# Patient Record
Sex: MALE | Race: WHITE | NOT HISPANIC OR LATINO | Employment: FULL TIME | URBAN - METROPOLITAN AREA
[De-identification: names, ages, dates, MRNs, and addresses within clinical notes are randomized per-mention and may not be internally consistent; named-entity substitution may affect disease eponyms.]

---

## 2023-05-15 DIAGNOSIS — I10 HYPERTENSION, UNSPECIFIED TYPE: ICD-10-CM

## 2023-05-15 DIAGNOSIS — B34.9 VIRAL SYNDROME: Primary | ICD-10-CM

## 2023-05-15 PROBLEM — E55.9 VITAMIN D DEFICIENCY: Status: ACTIVE | Noted: 2023-05-15
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
## 2023-05-15 PROBLEM — F33.41 RECURRENT MAJOR DEPRESSIVE DISORDER, IN PARTIAL REMISSION (CMS-HCC): Status: ACTIVE | Noted: 2023-05-15

## 2023-05-15 PROBLEM — E11.65 TYPE 2 DIABETES MELLITUS WITH HYPERGLYCEMIA, WITHOUT LONG-TERM CURRENT USE OF INSULIN (MULTI): Status: ACTIVE | Noted: 2023-05-15

## 2023-05-15 PROBLEM — R05.9 COUGH: Status: ACTIVE | Noted: 2023-05-15

## 2023-05-15 PROBLEM — J22 ACUTE LOWER RESPIRATORY TRACT INFECTION: Status: ACTIVE | Noted: 2023-05-15

## 2023-05-15 PROBLEM — N63.0 BREAST MASS IN MALE: Status: ACTIVE | Noted: 2023-05-15

## 2023-05-15 PROBLEM — J34.89 NASAL OBSTRUCTION: Status: ACTIVE | Noted: 2023-05-15

## 2023-05-15 PROBLEM — R68.82 DECREASED LIBIDO WITHOUT SEXUAL DYSFUNCTION: Status: ACTIVE | Noted: 2023-05-15

## 2023-05-15 PROBLEM — G47.33 OBSTRUCTIVE SLEEP APNEA OF ADULT: Status: ACTIVE | Noted: 2023-05-15

## 2023-05-15 PROBLEM — L02.215 PERINEAL ABSCESS, SUPERFICIAL: Status: ACTIVE | Noted: 2023-05-15

## 2023-05-15 PROBLEM — R79.89 LOW TESTOSTERONE IN MALE: Status: ACTIVE | Noted: 2023-05-15

## 2023-05-15 PROBLEM — K20.0 EOSINOPHILIC ESOPHAGITIS: Status: ACTIVE | Noted: 2023-05-15

## 2023-05-15 PROBLEM — R06.83 HABITUAL SNORING: Status: ACTIVE | Noted: 2023-05-15

## 2023-05-15 PROBLEM — G62.9 NEUROPATHY: Status: ACTIVE | Noted: 2023-05-15

## 2023-05-15 PROBLEM — K21.9 GERD (GASTROESOPHAGEAL REFLUX DISEASE): Status: ACTIVE | Noted: 2023-05-15

## 2023-05-15 PROBLEM — R29.818 SUSPECTED SLEEP APNEA: Status: ACTIVE | Noted: 2023-05-15

## 2023-05-15 PROBLEM — J30.89 ENVIRONMENTAL AND SEASONAL ALLERGIES: Status: ACTIVE | Noted: 2023-05-15

## 2023-05-15 RX ORDER — INSULIN GLARGINE 100 [IU]/ML
INJECTION, SOLUTION SUBCUTANEOUS
COMMUNITY
Start: 2021-10-21 | End: 2024-02-08 | Stop reason: SDUPTHER

## 2023-05-15 RX ORDER — ARIPIPRAZOLE 5 MG/1
5 TABLET ORAL DAILY
COMMUNITY

## 2023-05-15 RX ORDER — METFORMIN HYDROCHLORIDE 500 MG/1
1 TABLET, EXTENDED RELEASE ORAL 2 TIMES DAILY
COMMUNITY
End: 2024-02-08 | Stop reason: SDUPTHER

## 2023-05-15 RX ORDER — ALBUTEROL SULFATE 90 UG/1
2 AEROSOL, METERED RESPIRATORY (INHALATION)
COMMUNITY
Start: 2022-05-17 | End: 2023-07-10 | Stop reason: ALTCHOICE

## 2023-05-15 RX ORDER — ESOMEPRAZOLE MAGNESIUM 40 MG/1
40 CAPSULE, DELAYED RELEASE ORAL DAILY PRN
COMMUNITY
End: 2023-07-10 | Stop reason: SDUPTHER

## 2023-05-15 RX ORDER — PEN NEEDLE, DIABETIC 32GX 5/32"
NEEDLE, DISPOSABLE MISCELLANEOUS
COMMUNITY
Start: 2023-05-07 | End: 2024-02-08 | Stop reason: SDUPTHER

## 2023-05-15 RX ORDER — BLOOD-GLUCOSE METER
KIT MISCELLANEOUS
COMMUNITY
Start: 2022-03-29

## 2023-05-15 RX ORDER — LISINOPRIL 40 MG/1
1 TABLET ORAL DAILY
COMMUNITY
Start: 2019-10-31 | End: 2023-05-15 | Stop reason: SDUPTHER

## 2023-05-15 RX ORDER — FLUTICASONE FUROATE AND VILANTEROL TRIFENATATE 100; 25 UG/1; UG/1
1 POWDER RESPIRATORY (INHALATION)
COMMUNITY
Start: 2022-05-17 | End: 2023-07-10 | Stop reason: ALTCHOICE

## 2023-05-15 RX ORDER — SEMAGLUTIDE 1.34 MG/ML
0.25 INJECTION, SOLUTION SUBCUTANEOUS
COMMUNITY
End: 2023-07-10 | Stop reason: ALTCHOICE

## 2023-05-15 RX ORDER — LISINOPRIL 40 MG/1
TABLET ORAL
Qty: 90 TABLET | Refills: 0 | Status: SHIPPED | OUTPATIENT
Start: 2023-05-15 | End: 2023-07-10 | Stop reason: SDUPTHER

## 2023-05-15 RX ORDER — FAMOTIDINE 20 MG/1
20 TABLET, FILM COATED ORAL
COMMUNITY
Start: 2022-01-10 | End: 2023-07-10 | Stop reason: ALTCHOICE

## 2023-05-15 RX ORDER — LANCETS 28 GAUGE
1 EACH MISCELLANEOUS AS NEEDED
COMMUNITY
Start: 2023-02-03

## 2023-05-15 RX ORDER — DESVENLAFAXINE 100 MG/1
100 TABLET, EXTENDED RELEASE ORAL DAILY
COMMUNITY

## 2023-05-15 RX ORDER — LORATADINE 10 MG/1
1 CAPSULE, LIQUID FILLED ORAL DAILY
COMMUNITY

## 2023-05-15 RX ORDER — ROSUVASTATIN CALCIUM 5 MG/1
1 TABLET, COATED ORAL DAILY
COMMUNITY
Start: 2022-07-05 | End: 2024-02-08 | Stop reason: SDUPTHER

## 2023-05-15 RX ORDER — DULAGLUTIDE 3 MG/.5ML
INJECTION, SOLUTION SUBCUTANEOUS
COMMUNITY
Start: 2022-02-25 | End: 2024-03-21

## 2023-05-15 RX ORDER — MONTELUKAST SODIUM 10 MG/1
1 TABLET ORAL DAILY
COMMUNITY
Start: 2020-02-04 | End: 2023-05-15 | Stop reason: SDUPTHER

## 2023-05-15 RX ORDER — MONTELUKAST SODIUM 10 MG/1
TABLET ORAL
Qty: 90 TABLET | Refills: 0 | Status: SHIPPED | OUTPATIENT
Start: 2023-05-15 | End: 2023-07-10 | Stop reason: SDUPTHER

## 2023-05-15 RX ORDER — BENZONATATE 100 MG/1
100 CAPSULE ORAL 3 TIMES DAILY PRN
COMMUNITY
Start: 2022-05-06 | End: 2023-07-10 | Stop reason: ALTCHOICE

## 2023-05-15 RX ORDER — OMEPRAZOLE 40 MG/1
40 CAPSULE, DELAYED RELEASE ORAL
COMMUNITY
End: 2023-07-10

## 2023-05-15 RX ORDER — EMPAGLIFLOZIN 10 MG/1
1 TABLET, FILM COATED ORAL DAILY
COMMUNITY
Start: 2022-06-30 | End: 2024-02-08 | Stop reason: SDUPTHER

## 2023-07-10 ENCOUNTER — OFFICE VISIT (OUTPATIENT)
Dept: PRIMARY CARE | Facility: CLINIC | Age: 43
End: 2023-07-10
Payer: COMMERCIAL

## 2023-07-10 VITALS
WEIGHT: 280 LBS | SYSTOLIC BLOOD PRESSURE: 107 MMHG | BODY MASS INDEX: 39.61 KG/M2 | RESPIRATION RATE: 16 BRPM | TEMPERATURE: 96.8 F | OXYGEN SATURATION: 99 % | HEART RATE: 78 BPM | DIASTOLIC BLOOD PRESSURE: 72 MMHG

## 2023-07-10 DIAGNOSIS — E78.5 DYSLIPIDEMIA: ICD-10-CM

## 2023-07-10 DIAGNOSIS — K21.00 GASTROESOPHAGEAL REFLUX DISEASE WITH ESOPHAGITIS, UNSPECIFIED WHETHER HEMORRHAGE: Primary | ICD-10-CM

## 2023-07-10 DIAGNOSIS — I10 HYPERTENSION, UNSPECIFIED TYPE: ICD-10-CM

## 2023-07-10 DIAGNOSIS — B34.9 VIRAL SYNDROME: ICD-10-CM

## 2023-07-10 DIAGNOSIS — E11.65 TYPE 2 DIABETES MELLITUS WITH HYPERGLYCEMIA, WITHOUT LONG-TERM CURRENT USE OF INSULIN (MULTI): ICD-10-CM

## 2023-07-10 DIAGNOSIS — E55.9 VITAMIN D DEFICIENCY: ICD-10-CM

## 2023-07-10 PROBLEM — R06.83 HABITUAL SNORING: Status: RESOLVED | Noted: 2023-05-15 | Resolved: 2023-07-10
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
## 2023-07-10 PROBLEM — J22 ACUTE LOWER RESPIRATORY TRACT INFECTION: Status: RESOLVED | Noted: 2023-05-15 | Resolved: 2023-07-10

## 2023-07-10 PROBLEM — R29.818 SUSPECTED SLEEP APNEA: Status: RESOLVED | Noted: 2023-05-15 | Resolved: 2023-07-10

## 2023-07-10 PROBLEM — R05.9 COUGH: Status: RESOLVED | Noted: 2023-05-15 | Resolved: 2023-07-10

## 2023-07-10 PROCEDURE — 3074F SYST BP LT 130 MM HG: CPT

## 2023-07-10 PROCEDURE — 99214 OFFICE O/P EST MOD 30 MIN: CPT

## 2023-07-10 PROCEDURE — 1036F TOBACCO NON-USER: CPT

## 2023-07-10 PROCEDURE — 3044F HG A1C LEVEL LT 7.0%: CPT

## 2023-07-10 PROCEDURE — 4010F ACE/ARB THERAPY RXD/TAKEN: CPT

## 2023-07-10 PROCEDURE — 3078F DIAST BP <80 MM HG: CPT

## 2023-07-10 RX ORDER — LISINOPRIL 40 MG/1
40 TABLET ORAL DAILY
Qty: 90 TABLET | Refills: 3 | Status: SHIPPED | OUTPATIENT
Start: 2023-07-10 | End: 2024-01-10 | Stop reason: SDUPTHER

## 2023-07-10 RX ORDER — ESOMEPRAZOLE MAGNESIUM 40 MG/1
40 CAPSULE, DELAYED RELEASE ORAL
Qty: 90 CAPSULE | Refills: 3 | Status: SHIPPED | OUTPATIENT
Start: 2023-07-10 | End: 2024-01-10 | Stop reason: SDUPTHER

## 2023-07-10 RX ORDER — MONTELUKAST SODIUM 10 MG/1
10 TABLET ORAL DAILY
Qty: 90 TABLET | Refills: 3 | Status: SHIPPED | OUTPATIENT
Start: 2023-07-10 | End: 2024-01-10 | Stop reason: SDUPTHER

## 2023-07-10 SDOH — ECONOMIC STABILITY: FOOD INSECURITY: WITHIN THE PAST 12 MONTHS, YOU WORRIED THAT YOUR FOOD WOULD RUN OUT BEFORE YOU GOT MONEY TO BUY MORE.: NEVER TRUE

## 2023-07-10 SDOH — ECONOMIC STABILITY: FOOD INSECURITY: WITHIN THE PAST 12 MONTHS, THE FOOD YOU BOUGHT JUST DIDN'T LAST AND YOU DIDN'T HAVE MONEY TO GET MORE.: NEVER TRUE

## 2023-07-10 ASSESSMENT — ENCOUNTER SYMPTOMS
RESPIRATORY NEGATIVE: 1
MUSCULOSKELETAL NEGATIVE: 1
EYES NEGATIVE: 1
CONSTITUTIONAL NEGATIVE: 1
ENDOCRINE NEGATIVE: 1
NEUROLOGICAL NEGATIVE: 1
PSYCHIATRIC NEGATIVE: 1
GASTROINTESTINAL NEGATIVE: 1
CARDIOVASCULAR NEGATIVE: 1
HEMATOLOGIC/LYMPHATIC NEGATIVE: 1

## 2023-07-10 ASSESSMENT — PAIN SCALES - GENERAL: PAINLEVEL: 0-NO PAIN

## 2023-07-10 ASSESSMENT — PATIENT HEALTH QUESTIONNAIRE - PHQ9
SUM OF ALL RESPONSES TO PHQ9 QUESTIONS 1 & 2: 0
2. FEELING DOWN, DEPRESSED OR HOPELESS: NOT AT ALL
1. LITTLE INTEREST OR PLEASURE IN DOING THINGS: NOT AT ALL

## 2023-07-10 ASSESSMENT — LIFESTYLE VARIABLES
SKIP TO QUESTIONS 9-10: 1
HOW OFTEN DO YOU HAVE SIX OR MORE DRINKS ON ONE OCCASION: NEVER
HOW OFTEN DO YOU HAVE A DRINK CONTAINING ALCOHOL: MONTHLY OR LESS
HOW MANY STANDARD DRINKS CONTAINING ALCOHOL DO YOU HAVE ON A TYPICAL DAY: 1 OR 2
AUDIT-C TOTAL SCORE: 1

## 2023-07-10 NOTE — PROGRESS NOTES
"Subjective   Patient ID: Elie Mendenhall is a 43 y.o. male who presents for routine follow up visit and follow up of hypertension, DM2 and GERD.  Following with Dr. Reagan in endocrinology for DM2.      Diet: Mostly chicken and chicken and soup, chicken and rice, broccoli, riced cauliflower. [place] for breakfast, smart carb. Carb controlled diet. 3-4 cans diet soda per day  Exercise: No regular exercise  Weight: Down 23lbs since 2/2023  Water: Drinking about 60 oz per day  Sleep: Good sleep, using CPAP nightly. Getting about 8-9 hours per night  Social: , lives in a 2 floor home, no issues with stairs. No children, no pets  Professional: Currently in between jobs, /manager    Review of Systems   Constitutional: Negative.    HENT: Negative.     Eyes: Negative.    Respiratory: Negative.     Cardiovascular: Negative.    Gastrointestinal: Negative.    Endocrine: Negative.    Genitourinary: Negative.    Musculoskeletal: Negative.    Skin: Negative.    Neurological: Negative.    Hematological: Negative.    Psychiatric/Behavioral: Negative.          Current Outpatient Medications   Medication Sig Dispense Refill    ARIPiprazole (Abilify) 5 mg tablet Take 1 tablet (5 mg) by mouth once daily.      Basaglar KwikPen U-100 Insulin 100 unit/mL (3 mL) pen Inject under the skin.      BD Taylor 2nd Gen Pen Needle 32 gauge x 5/32\" needle       desvenlafaxine 100 mg 24 hr tablet Take 1 tablet (100 mg) by mouth once daily.      FreeStyle Lancets 28 gauge 1 each if needed.      FreeStyle Lite Strips strip TEST THREE TIMES A DAY      Jardiance 10 mg Take 1 tablet (10 mg) by mouth once daily.      loratadine (Claritin Liqui-Gel) 10 mg capsule Take 1 tablet by mouth once daily.      metFORMIN XR (Glucophage-XR) 500 mg 24 hr tablet Take 1 tablet (500 mg) by mouth 2 times a day.      rosuvastatin (Crestor) 5 mg tablet Take 1 tablet (5 mg) by mouth once daily.      Trulicity 3 mg/0.5 mL pen injector Inject under the skin.      " esomeprazole (NexIUM) 40 mg DR capsule Take 1 capsule (40 mg) by mouth once daily in the morning. Take before meals. 90 capsule 3    lisinopril 40 mg tablet Take 1 tablet (40 mg) by mouth once daily. 90 tablet 3    montelukast (Singulair) 10 mg tablet Take 1 tablet (10 mg) by mouth once daily. 90 tablet 3     No current facility-administered medications for this visit.     Past Surgical History:   Procedure Laterality Date    OTHER SURGICAL HISTORY  04/04/2016    Ant Spinal Diskect Osteophytect Lumb Interspace Microdiscect    OTHER SURGICAL HISTORY  01/30/2020    Corneal lasik    OTHER SURGICAL HISTORY  01/30/2020    Tonsillectomy     Family History   Problem Relation Name Age of Onset    Ovarian cancer Mother      Arthritis Father      Depression Father      Other (stroke syndrome) Other        Social History     Tobacco Use    Smoking status: Never    Smokeless tobacco: Never   Vaping Use    Vaping Use: Former   Substance Use Topics    Alcohol use: Not Currently    Drug use: Never        Objective     Visit Vitals  /72 (BP Location: Right arm, Patient Position: Sitting, BP Cuff Size: Large adult)   Pulse 78   Temp 36 °C (96.8 °F) (Temporal)   Resp 16   Wt 127 kg (280 lb)   SpO2 99%   BMI 39.61 kg/m²   Smoking Status Never   BSA 2.51 m²        Physical Exam  Constitutional:       Appearance: He is obese.   HENT:      Head: Normocephalic and atraumatic.   Eyes:      Extraocular Movements: Extraocular movements intact.      Pupils: Pupils are equal, round, and reactive to light.   Cardiovascular:      Rate and Rhythm: Normal rate and regular rhythm.   Pulmonary:      Effort: Pulmonary effort is normal.      Breath sounds: Normal breath sounds.   Abdominal:      General: Abdomen is flat. Bowel sounds are normal.      Palpations: Abdomen is soft.   Musculoskeletal:         General: Normal range of motion.      Cervical back: Neck supple. No tenderness.   Skin:     General: Skin is warm and dry.   Neurological:       General: No focal deficit present.      Mental Status: He is alert and oriented to person, place, and time.   Psychiatric:         Mood and Affect: Mood normal.         Behavior: Behavior normal.           Assessment/Plan   Problem List Items Addressed This Visit       GERD (gastroesophageal reflux disease) - Primary     Well managed on esomeprazole 40mg daily, cannot miss for more than 1 day         Relevant Medications    esomeprazole (NexIUM) 40 mg DR capsule    Type 2 diabetes mellitus with hyperglycemia, without long-term current use of insulin (CMS/[facility])    Relevant Orders    CBC    Hemoglobin A1C    Comprehensive Metabolic Panel    Vitamin D deficiency     Vitamin D low at 28 in 2021    Repeat vitamin D level         Relevant Orders    Vitamin D, Total     Other Visit Diagnoses       Viral syndrome        Relevant Medications    montelukast (Singulair) 10 mg tablet    Hypertension, unspecified type        Relevant Medications    lisinopril 40 mg tablet    Dyslipidemia        Relevant Orders    Lipid Panel          All pertinent lab work and results were reviewed with patient.     Follow up with me in 6 months     MICHELLE Virgen-CNS

## 2023-07-10 NOTE — PATIENT INSTRUCTIONS
Thank you for coming to see me today.  If you have any questions or concerns following our visit, please contact the office.  Phone: [phone]    Follow up with me in 6 months or sooner as needed    1)  Get fasting labwork in the next 1-2 weeks.  The lab is down the [place] from our office.

## 2023-11-29 ENCOUNTER — TELEPHONE (OUTPATIENT)
Dept: PRIMARY CARE | Facility: CLINIC | Age: 43
End: 2023-11-29
Payer: COMMERCIAL

## 2023-11-29 NOTE — TELEPHONE ENCOUNTER
Patient called in stating that he has been having dizzy spells for about a month or so. Patient states that he gets head aches as well but isnt sure if it is related. Patient states that it is random not consistent but has been getting worse over the last week

## 2024-01-05 ENCOUNTER — LAB (OUTPATIENT)
Dept: LAB | Facility: LAB | Age: 44
End: 2024-01-05
Payer: COMMERCIAL

## 2024-01-05 DIAGNOSIS — E78.5 DYSLIPIDEMIA: ICD-10-CM

## 2024-01-05 DIAGNOSIS — E55.9 VITAMIN D DEFICIENCY: ICD-10-CM

## 2024-01-05 DIAGNOSIS — E11.65 TYPE 2 DIABETES MELLITUS WITH HYPERGLYCEMIA, WITHOUT LONG-TERM CURRENT USE OF INSULIN (MULTI): ICD-10-CM

## 2024-01-05 LAB
25(OH)D3 SERPL-MCNC: 13 NG/ML (ref 30–100)
ALBUMIN SERPL BCP-MCNC: 4.3 G/DL (ref 3.4–5)
ALP SERPL-CCNC: 69 U/L (ref 33–120)
ALT SERPL W P-5'-P-CCNC: 37 U/L (ref 10–52)
ANION GAP SERPL CALC-SCNC: 12 MMOL/L (ref 10–20)
AST SERPL W P-5'-P-CCNC: 21 U/L (ref 9–39)
BILIRUB SERPL-MCNC: 0.4 MG/DL (ref 0–1.2)
BUN SERPL-MCNC: 18 MG/DL (ref 6–23)
CALCIUM SERPL-MCNC: 9.3 MG/DL (ref 8.6–10.3)
CHLORIDE SERPL-SCNC: 103 MMOL/L (ref 98–107)
CHOLEST SERPL-MCNC: 135 MG/DL (ref 0–199)
CHOLESTEROL/HDL RATIO: 3.5
CO2 SERPL-SCNC: 27 MMOL/L (ref 21–32)
CREAT SERPL-MCNC: 0.93 MG/DL (ref 0.5–1.3)
ERYTHROCYTE [DISTWIDTH] IN BLOOD BY AUTOMATED COUNT: 13.7 % (ref 11.5–14.5)
EST. AVERAGE GLUCOSE BLD GHB EST-MCNC: 131 MG/DL
GFR SERPL CREATININE-BSD FRML MDRD: >90 ML/MIN/1.73M*2
GLUCOSE SERPL-MCNC: 117 MG/DL (ref 74–99)
HBA1C MFR BLD: 6.2 %
HCT VFR BLD AUTO: 47.1 % (ref 41–52)
HDLC SERPL-MCNC: 38.2 MG/DL
HGB BLD-MCNC: 15.4 G/DL (ref 13.5–17.5)
LDLC SERPL CALC-MCNC: 72 MG/DL
MCH RBC QN AUTO: 27.3 PG (ref 26–34)
MCHC RBC AUTO-ENTMCNC: 32.7 G/DL (ref 32–36)
MCV RBC AUTO: 83 FL (ref 80–100)
NON HDL CHOLESTEROL: 97 MG/DL (ref 0–149)
NRBC BLD-RTO: 0 /100 WBCS (ref 0–0)
PLATELET # BLD AUTO: 281 X10*3/UL (ref 150–450)
POTASSIUM SERPL-SCNC: 4 MMOL/L (ref 3.5–5.3)
PROT SERPL-MCNC: 6.7 G/DL (ref 6.4–8.2)
RBC # BLD AUTO: 5.65 X10*6/UL (ref 4.5–5.9)
SODIUM SERPL-SCNC: 138 MMOL/L (ref 136–145)
TRIGL SERPL-MCNC: 125 MG/DL (ref 0–149)
VLDL: 25 MG/DL (ref 0–40)
WBC # BLD AUTO: 10.1 X10*3/UL (ref 4.4–11.3)

## 2024-01-05 PROCEDURE — 36415 COLL VENOUS BLD VENIPUNCTURE: CPT

## 2024-01-05 PROCEDURE — 85027 COMPLETE CBC AUTOMATED: CPT

## 2024-01-05 PROCEDURE — 82306 VITAMIN D 25 HYDROXY: CPT

## 2024-01-05 PROCEDURE — 83036 HEMOGLOBIN GLYCOSYLATED A1C: CPT

## 2024-01-05 PROCEDURE — 80061 LIPID PANEL: CPT

## 2024-01-05 PROCEDURE — 80053 COMPREHEN METABOLIC PANEL: CPT

## 2024-01-10 ENCOUNTER — OFFICE VISIT (OUTPATIENT)
Dept: PRIMARY CARE | Facility: CLINIC | Age: 44
End: 2024-01-10
Payer: COMMERCIAL

## 2024-01-10 VITALS
RESPIRATION RATE: 20 BRPM | DIASTOLIC BLOOD PRESSURE: 77 MMHG | SYSTOLIC BLOOD PRESSURE: 109 MMHG | OXYGEN SATURATION: 96 % | HEIGHT: 71 IN | HEART RATE: 95 BPM | WEIGHT: 310 LBS | TEMPERATURE: 96.9 F | BODY MASS INDEX: 43.4 KG/M2

## 2024-01-10 DIAGNOSIS — G47.33 OBSTRUCTIVE SLEEP APNEA OF ADULT: ICD-10-CM

## 2024-01-10 DIAGNOSIS — J30.89 ENVIRONMENTAL AND SEASONAL ALLERGIES: ICD-10-CM

## 2024-01-10 DIAGNOSIS — R42 VERTIGO: Primary | ICD-10-CM

## 2024-01-10 DIAGNOSIS — K21.00 GASTROESOPHAGEAL REFLUX DISEASE WITH ESOPHAGITIS, UNSPECIFIED WHETHER HEMORRHAGE: ICD-10-CM

## 2024-01-10 DIAGNOSIS — F33.41 RECURRENT MAJOR DEPRESSIVE DISORDER, IN PARTIAL REMISSION (CMS-HCC): ICD-10-CM

## 2024-01-10 DIAGNOSIS — I10 HYPERTENSION, UNSPECIFIED TYPE: ICD-10-CM

## 2024-01-10 DIAGNOSIS — I10 BENIGN ESSENTIAL HYPERTENSION: ICD-10-CM

## 2024-01-10 DIAGNOSIS — B34.9 VIRAL SYNDROME: ICD-10-CM

## 2024-01-10 DIAGNOSIS — R42 DIZZINESS: ICD-10-CM

## 2024-01-10 DIAGNOSIS — E55.9 VITAMIN D DEFICIENCY: ICD-10-CM

## 2024-01-10 PROBLEM — L02.215 PERINEAL ABSCESS, SUPERFICIAL: Status: RESOLVED | Noted: 2023-05-15 | Resolved: 2024-01-10

## 2024-01-10 PROCEDURE — 3078F DIAST BP <80 MM HG: CPT

## 2024-01-10 PROCEDURE — 1036F TOBACCO NON-USER: CPT

## 2024-01-10 PROCEDURE — 3048F LDL-C <100 MG/DL: CPT

## 2024-01-10 PROCEDURE — 3074F SYST BP LT 130 MM HG: CPT

## 2024-01-10 PROCEDURE — 99214 OFFICE O/P EST MOD 30 MIN: CPT

## 2024-01-10 PROCEDURE — 3044F HG A1C LEVEL LT 7.0%: CPT

## 2024-01-10 PROCEDURE — 4010F ACE/ARB THERAPY RXD/TAKEN: CPT

## 2024-01-10 RX ORDER — ESOMEPRAZOLE MAGNESIUM 40 MG/1
40 CAPSULE, DELAYED RELEASE ORAL
Qty: 90 CAPSULE | Refills: 3 | Status: SHIPPED | OUTPATIENT
Start: 2024-01-10

## 2024-01-10 RX ORDER — LISINOPRIL 40 MG/1
40 TABLET ORAL DAILY
Qty: 90 TABLET | Refills: 3 | Status: SHIPPED | OUTPATIENT
Start: 2024-01-10

## 2024-01-10 RX ORDER — MECLIZINE HYDROCHLORIDE 25 MG/1
25 TABLET ORAL 3 TIMES DAILY PRN
Qty: 30 TABLET | Refills: 1 | Status: SHIPPED | OUTPATIENT
Start: 2024-01-10 | End: 2025-01-09

## 2024-01-10 RX ORDER — ERGOCALCIFEROL 1.25 MG/1
50000 CAPSULE ORAL
Qty: 8 CAPSULE | Refills: 0 | Status: SHIPPED | OUTPATIENT
Start: 2024-01-10

## 2024-01-10 RX ORDER — MONTELUKAST SODIUM 10 MG/1
10 TABLET ORAL DAILY
Qty: 90 TABLET | Refills: 3 | Status: SHIPPED | OUTPATIENT
Start: 2024-01-10

## 2024-01-10 SDOH — ECONOMIC STABILITY: FOOD INSECURITY: WITHIN THE PAST 12 MONTHS, THE FOOD YOU BOUGHT JUST DIDN'T LAST AND YOU DIDN'T HAVE MONEY TO GET MORE.: NEVER TRUE

## 2024-01-10 SDOH — ECONOMIC STABILITY: FOOD INSECURITY: WITHIN THE PAST 12 MONTHS, YOU WORRIED THAT YOUR FOOD WOULD RUN OUT BEFORE YOU GOT MONEY TO BUY MORE.: NEVER TRUE

## 2024-01-10 ASSESSMENT — PATIENT HEALTH QUESTIONNAIRE - PHQ9
2. FEELING DOWN, DEPRESSED OR HOPELESS: SEVERAL DAYS
10. IF YOU CHECKED OFF ANY PROBLEMS, HOW DIFFICULT HAVE THESE PROBLEMS MADE IT FOR YOU TO DO YOUR WORK, TAKE CARE OF THINGS AT HOME, OR GET ALONG WITH OTHER PEOPLE: SOMEWHAT DIFFICULT
SUM OF ALL RESPONSES TO PHQ9 QUESTIONS 1 & 2: 2
1. LITTLE INTEREST OR PLEASURE IN DOING THINGS: SEVERAL DAYS

## 2024-01-10 ASSESSMENT — ENCOUNTER SYMPTOMS
EYES NEGATIVE: 1
HEMATOLOGIC/LYMPHATIC NEGATIVE: 1
MUSCULOSKELETAL NEGATIVE: 1
NEUROLOGICAL NEGATIVE: 1
RESPIRATORY NEGATIVE: 1
GASTROINTESTINAL NEGATIVE: 1
CARDIOVASCULAR NEGATIVE: 1
CONSTITUTIONAL NEGATIVE: 1
ENDOCRINE NEGATIVE: 1
PSYCHIATRIC NEGATIVE: 1

## 2024-01-10 ASSESSMENT — LIFESTYLE VARIABLES
HOW OFTEN DO YOU HAVE A DRINK CONTAINING ALCOHOL: MONTHLY OR LESS
HOW OFTEN DO YOU HAVE SIX OR MORE DRINKS ON ONE OCCASION: NEVER
AUDIT-C TOTAL SCORE: 1
SKIP TO QUESTIONS 9-10: 1
HOW MANY STANDARD DRINKS CONTAINING ALCOHOL DO YOU HAVE ON A TYPICAL DAY: 1 OR 2

## 2024-01-10 NOTE — ASSESSMENT & PLAN NOTE
Following with psychiatrist Venus Domingo at [facility] in [place] who is managing aripiprazole and desvenlafaxine. Mood good on these.

## 2024-01-10 NOTE — ASSESSMENT & PLAN NOTE
Vitamin D low at 13 on labs from 1/2023    Start ergocalciferol 46362 units weekly x 8 weeks then start vitamin D 2000 units daily

## 2024-01-10 NOTE — ASSESSMENT & PLAN NOTE
HR rate and rhythm normal, relatively normotensive in office at 109/77  Describes room spinning rather than lightheaded/passing out sensation    Start meclizine 25mg TID PRN for dizziness  Consider CV/neuro testing if ineffective or symptoms worsen

## 2024-01-10 NOTE — PROGRESS NOTES
"Subjective   Patient ID: Elie Mendenhall is a 43 y.o. male who presents for 6 month follow up and evaluation of dizziness and nasal congestion.    Reports dizzy spells for the past 3 months, advised to check blood pressures. Hasn't covered every instance, sometimes would walk into the car after work and won't go back in to office to check it. 110-130s/70-90s when checked incidentally. Half of the time has concurrent headaches and nausea but nothing otherwise. Has had a few times before alexa felt like was going to throw up. Room spinning.     Diet: Mostly chicken and chicken and soup, chicken and rice, broccoli, riced cauliflower. [place] for breakfast, smart carb. Carb controlled diet. 3-4 cans diet soda per day  Exercise: No regular exercise  Weight: Down 23lbs since 2/2023  Water: Drinking about 60 oz per day  Sleep: Good sleep, using CPAP nightly. Getting about 8-9 hours per night  Social: , lives in a 2 floor home, no issues with stairs. No children, no pets  Professional: Working full time in /manager    Review of Systems   Constitutional: Negative.    HENT: Negative.     Eyes: Negative.    Respiratory: Negative.     Cardiovascular: Negative.    Gastrointestinal: Negative.    Endocrine: Negative.    Genitourinary: Negative.    Musculoskeletal: Negative.    Skin: Negative.    Neurological: Negative.    Hematological: Negative.    Psychiatric/Behavioral: Negative.          Current Outpatient Medications   Medication Sig Dispense Refill    ARIPiprazole (Abilify) 5 mg tablet Take 1 tablet (5 mg) by mouth once daily.      Basaglar KwikPen U-100 Insulin 100 unit/mL (3 mL) pen Inject under the skin.      BD Taylor 2nd Gen Pen Needle 32 gauge x 5/32\" needle       desvenlafaxine 100 mg 24 hr tablet Take 1 tablet (100 mg) by mouth once daily.      FreeStyle Lancets 28 gauge 1 each if needed.      FreeStyle Lite Strips strip TEST THREE TIMES A DAY      Jardiance 10 mg Take 1 tablet (10 mg) by mouth once " "daily.      loratadine (Claritin Liqui-Gel) 10 mg capsule Take 1 tablet by mouth once daily.      metFORMIN XR (Glucophage-XR) 500 mg 24 hr tablet Take 1 tablet (500 mg) by mouth 2 times a day.      rosuvastatin (Crestor) 5 mg tablet Take 1 tablet (5 mg) by mouth once daily.      Trulicity 3 mg/0.5 mL pen injector Inject under the skin.      ergocalciferol (Vitamin D-2) 1.25 MG (56825 UT) capsule Take 1 capsule (50,000 Units) by mouth 1 (one) time per week. Start vitamin D 2000 units daily after completing this medication 8 capsule 0    esomeprazole (NexIUM) 40 mg DR capsule Take 1 capsule (40 mg) by mouth once daily in the morning. Take before meals. 90 capsule 3    lisinopril 40 mg tablet Take 1 tablet (40 mg) by mouth once daily. 90 tablet 3    meclizine (Antivert) 25 mg tablet Take 1 tablet (25 mg) by mouth 3 times a day as needed for dizziness. 30 tablet 1    montelukast (Singulair) 10 mg tablet Take 1 tablet (10 mg) by mouth once daily. 90 tablet 3     No current facility-administered medications for this visit.     Past Surgical History:   Procedure Laterality Date    OTHER SURGICAL HISTORY  04/04/2016    Ant Spinal Diskect Osteophytect Lumb Interspace Microdiscect    OTHER SURGICAL HISTORY  01/30/2020    Corneal lasik    OTHER SURGICAL HISTORY  01/30/2020    Tonsillectomy     Family History   Problem Relation Name Age of Onset    Ovarian cancer Mother      Arthritis Father      Depression Father      Other (stroke syndrome) Other        Social History     Tobacco Use    Smoking status: Never    Smokeless tobacco: Never   Vaping Use    Vaping Use: Former   Substance Use Topics    Alcohol use: Not Currently    Drug use: Never        Objective     Visit Vitals  /77 (BP Location: Left arm, Patient Position: Sitting, BP Cuff Size: Adult)   Pulse 95   Temp 36.1 °C (96.9 °F) (Temporal)   Resp 20   Ht 1.791 m (5' 10.5\")   Wt 141 kg (310 lb)   SpO2 96%   BMI 43.85 kg/m²   Smoking Status Never   BSA 2.65 m²    "     Physical Exam  Constitutional:       Appearance: Normal appearance. He is obese.   HENT:      Head: Normocephalic and atraumatic.   Eyes:      Extraocular Movements: Extraocular movements intact.      Pupils: Pupils are equal, round, and reactive to light.   Cardiovascular:      Rate and Rhythm: Normal rate and regular rhythm.   Pulmonary:      Effort: Pulmonary effort is normal.      Breath sounds: Normal breath sounds.   Abdominal:      General: Abdomen is flat. Bowel sounds are normal.      Palpations: Abdomen is soft.   Musculoskeletal:         General: Normal range of motion.   Skin:     General: Skin is warm and dry.      Capillary Refill: Capillary refill takes less than 2 seconds.   Neurological:      General: No focal deficit present.      Mental Status: He is alert and oriented to person, place, and time.   Psychiatric:         Mood and Affect: Mood normal.         Behavior: Behavior normal.           Assessment/Plan   Problem List Items Addressed This Visit       Benign essential hypertension     Normotensive in office at 109/77    Continue lisinopril 40mg daily         GERD (gastroesophageal reflux disease)    Relevant Medications    esomeprazole (NexIUM) 40 mg DR capsule    Environmental and seasonal allergies     Reporting persistent rhinorrhea with clear drainage, constantly having to blow his nose  Nasal passages without redness, swelling obstruction. Ears without bulging or other abnormalities    Start fluticasone nasal spray, 1 spray each nostril twice daily. Saline spray PRN moisture.         Obstructive sleep apnea of adult     Using CPAP at home, managed by previous PCP  Settings still good, getting good sleep         Recurrent major depressive disorder, in partial remission (CMS/HCC)     Following with psychiatrist Venus Domingo at [facility] who is managing aripiprazole and desvenlafaxine. Mood good on these.         Vitamin D deficiency     Vitamin D low at 13 on labs from  1/2023    Start ergocalciferol 71485 units weekly x 8 weeks then start vitamin D 2000 units daily         Relevant Medications    ergocalciferol (Vitamin D-2) 1.25 MG (16393 UT) capsule    Dizziness     HR rate and rhythm normal, relatively normotensive in office at 109/77  Describes room spinning rather than lightheaded/passing out sensation    Start meclizine 25mg TID PRN for dizziness  Consider CV/neuro testing if ineffective or symptoms worsen          Other Visit Diagnoses       Vertigo    -  Primary    Relevant Medications    meclizine (Antivert) 25 mg tablet    Viral syndrome        Relevant Medications    montelukast (Singulair) 10 mg tablet    Hypertension, unspecified type        Relevant Medications    lisinopril 40 mg tablet            All pertinent lab work and results were reviewed with patient.     Follow up with me in 6 months    MICHELLE Virgen-CNS

## 2024-01-10 NOTE — ASSESSMENT & PLAN NOTE
Reporting persistent rhinorrhea with clear drainage, constantly having to blow his nose  Nasal passages without redness, swelling obstruction. Ears without bulging or other abnormalities    Start fluticasone nasal spray, 1 spray each nostril twice daily. Saline spray PRN moisture.

## 2024-01-10 NOTE — PATIENT INSTRUCTIONS
Thank you for coming to see me today.  If you have any questions or concerns following our visit, please contact the office.  Phone: [phone]    Follow up with me in 6 months    1)  Start ergocalciferol 15092 units weekly x 8 weeks then start vitamin D 2000 units daily    2) START meclizine 25mg up to three times daily as needed for dizziness/vertigo    3) START fluticasone nasal spray 1 spray each nostril twice daily. Insert nozzle into nostril, aim toward eye ball and spray. Can also use saline mist nasal spray afterward to moisturize passages and prevent nosebleeds

## 2024-02-01 RX ORDER — INSULIN GLARGINE 100 [IU]/ML
INJECTION, SOLUTION SUBCUTANEOUS
Qty: 75 ML | Refills: 3 | OUTPATIENT
Start: 2024-02-01

## 2024-02-01 RX ORDER — PEN NEEDLE, DIABETIC 32GX 5/32"
NEEDLE, DISPOSABLE MISCELLANEOUS
Qty: 90 EACH | Refills: 3 | OUTPATIENT
Start: 2024-02-01

## 2024-02-01 RX ORDER — METFORMIN HYDROCHLORIDE 500 MG/1
1000 TABLET, EXTENDED RELEASE ORAL 2 TIMES DAILY
Qty: 360 TABLET | Refills: 3 | OUTPATIENT
Start: 2024-02-01

## 2024-02-08 ENCOUNTER — OFFICE VISIT (OUTPATIENT)
Dept: ENDOCRINOLOGY | Facility: CLINIC | Age: 44
End: 2024-02-08
Payer: COMMERCIAL

## 2024-02-08 VITALS
HEIGHT: 71 IN | DIASTOLIC BLOOD PRESSURE: 74 MMHG | BODY MASS INDEX: 43.51 KG/M2 | RESPIRATION RATE: 16 BRPM | SYSTOLIC BLOOD PRESSURE: 138 MMHG | WEIGHT: 310.8 LBS | HEART RATE: 101 BPM

## 2024-02-08 DIAGNOSIS — E11.65 TYPE 2 DIABETES MELLITUS WITH HYPERGLYCEMIA, WITHOUT LONG-TERM CURRENT USE OF INSULIN (MULTI): Primary | ICD-10-CM

## 2024-02-08 DIAGNOSIS — G47.33 OBSTRUCTIVE SLEEP APNEA OF ADULT: ICD-10-CM

## 2024-02-08 DIAGNOSIS — I10 BENIGN ESSENTIAL HYPERTENSION: ICD-10-CM

## 2024-02-08 PROCEDURE — 99214 OFFICE O/P EST MOD 30 MIN: CPT | Performed by: INTERNAL MEDICINE

## 2024-02-08 PROCEDURE — 3048F LDL-C <100 MG/DL: CPT | Performed by: INTERNAL MEDICINE

## 2024-02-08 PROCEDURE — 3075F SYST BP GE 130 - 139MM HG: CPT | Performed by: INTERNAL MEDICINE

## 2024-02-08 PROCEDURE — 1036F TOBACCO NON-USER: CPT | Performed by: INTERNAL MEDICINE

## 2024-02-08 PROCEDURE — 4010F ACE/ARB THERAPY RXD/TAKEN: CPT | Performed by: INTERNAL MEDICINE

## 2024-02-08 PROCEDURE — 3044F HG A1C LEVEL LT 7.0%: CPT | Performed by: INTERNAL MEDICINE

## 2024-02-08 PROCEDURE — 3078F DIAST BP <80 MM HG: CPT | Performed by: INTERNAL MEDICINE

## 2024-02-08 RX ORDER — ROSUVASTATIN CALCIUM 5 MG/1
5 TABLET, COATED ORAL DAILY
Qty: 90 TABLET | Refills: 3 | Status: SHIPPED | OUTPATIENT
Start: 2024-02-08 | End: 2025-02-07

## 2024-02-08 RX ORDER — METFORMIN HYDROCHLORIDE 500 MG/1
500 TABLET, EXTENDED RELEASE ORAL
Qty: 180 TABLET | Refills: 3 | Status: SHIPPED | OUTPATIENT
Start: 2024-02-08 | End: 2025-02-07

## 2024-02-08 RX ORDER — TIRZEPATIDE 2.5 MG/.5ML
2.5 INJECTION, SOLUTION SUBCUTANEOUS
Qty: 2 ML | Refills: 0 | COMMUNITY
Start: 2024-02-08 | End: 2024-03-09

## 2024-02-08 RX ORDER — INSULIN DEGLUDEC 100 U/ML
80 INJECTION, SOLUTION SUBCUTANEOUS DAILY
Qty: 72 ML | Refills: 3 | Status: SHIPPED | OUTPATIENT
Start: 2024-02-08 | End: 2025-02-07

## 2024-02-08 RX ORDER — PEN NEEDLE, DIABETIC 30 GX3/16"
NEEDLE, DISPOSABLE MISCELLANEOUS
Qty: 100 EACH | Refills: 3 | Status: SHIPPED | OUTPATIENT
Start: 2024-02-08

## 2024-02-08 ASSESSMENT — ENCOUNTER SYMPTOMS
DIARRHEA: 0
CHILLS: 0
SHORTNESS OF BREATH: 0
NAUSEA: 0
FEVER: 0
VOMITING: 0
DIZZINESS: 0
LIGHT-HEADEDNESS: 0

## 2024-02-08 NOTE — PATIENT INSTRUCTIONS
Use up trulcity then try mounjaro  Message with response  Keep working on eating and activity  Follow upi n 6 months

## 2024-02-08 NOTE — PROGRESS NOTES
"Endocrinology: Follow up visit  Subjective   Patient ID: Elie Mendenhall is a 43 y.o. male who presents for Diabetes (Type 2 ) and Hypertension.    PCP: MICHELLE Virgen-CNS    HPI  Last seen a year ago.   Job issues this year: changed jobs multiple times and even moved out of state but now back.  Struggled with mood with all the changes.  Hoping to get back on track with eating and activity  A1c great.   Taking meds as directed.   Ozempic intolerant    Review of Systems   Constitutional:  Negative for chills and fever.   Respiratory:  Negative for shortness of breath.    Gastrointestinal:  Negative for diarrhea, nausea and vomiting.   Endocrine: Negative for cold intolerance and heat intolerance.   Neurological:  Negative for dizziness and light-headedness.       Patient Active Problem List   Diagnosis    Benign essential hypertension    Breast mass in male    Decreased libido without sexual dysfunction    Neuropathy    Nasal obstruction    Low testosterone in male    GERD (gastroesophageal reflux disease)    Eosinophilic esophagitis    Environmental and seasonal allergies    Obstructive sleep apnea of adult    Recurrent major depressive disorder, in partial remission (CMS/[facility])    Type 2 diabetes mellitus with hyperglycemia, without long-term current use of insulin (CMS/[facility])    Vitamin D deficiency    Dizziness        Home Meds:  Current Outpatient Medications   Medication Instructions    ARIPiprazole (ABILIFY) 5 mg, oral, Daily    Basaglar KwikPen U-100 Insulin 100 unit/mL (3 mL) pen subcutaneous    BD Taylor 2nd Gen Pen Needle 32 gauge x 5/32\" needle     desvenlafaxine (PRISTIQ) 100 mg, oral, Daily    ergocalciferol (VITAMIN D-2) 50,000 Units, oral, Weekly, Start vitamin D 2000 units daily after completing this medication    esomeprazole (NEXIUM) 40 mg, oral, Daily before breakfast    FreeStyle Lancets 28 gauge 1 each, miscellaneous, As needed    FreeStyle Lite Strips strip TEST THREE TIMES A DAY    Jardiance " "10 mg 1 tablet, oral, Daily    lisinopril 40 mg, oral, Daily    loratadine (Claritin Liqui-Gel) 10 mg capsule 1 tablet, oral, Daily    meclizine (ANTIVERT) 25 mg, oral, 3 times daily PRN    metFORMIN XR (Glucophage-XR) 500 mg 24 hr tablet 1 tablet, oral, 2 times daily    montelukast (SINGULAIR) 10 mg, oral, Daily    rosuvastatin (Crestor) 5 mg tablet 1 tablet, oral, Daily    Trulicity 3 mg/0.5 mL pen injector subcutaneous        Allergies   Allergen Reactions    Egg Other    Cefaclor Hives        Objective   Vitals:    02/08/24 1119   BP: 138/74   Pulse: 101   Resp: 16      Vitals:    02/08/24 1119   Weight: 141 kg (310 lb 12.8 oz)      Body mass index is 43.96 kg/m².   Physical Exam  Constitutional:       Appearance: Normal appearance. He is overweight.   HENT:      Head: Normocephalic and atraumatic.   Neck:      Thyroid: No thyroid mass, thyromegaly or thyroid tenderness.   Cardiovascular:      Rate and Rhythm: Normal rate and regular rhythm.      Heart sounds: No murmur heard.     No gallop.   Pulmonary:      Effort: Pulmonary effort is normal.      Breath sounds: Normal breath sounds.   Abdominal:      Palpations: Abdomen is soft.      Comments: benign   Neurological:      General: No focal deficit present.      Mental Status: He is alert and oriented to person, place, and time.      Deep Tendon Reflexes: Reflexes are normal and symmetric.   Psychiatric:         Behavior: Behavior is cooperative.         Labs:  Lab Results   Component Value Date    HGBA1C 6.2 (H) 01/05/2024    TSH 2.19 10/19/2021      No results found for: \"PR1\", \"THYROIDPAB\", \"TSI\"     Assessment/Plan   Problem List Items Addressed This Visit       Benign essential hypertension    Obstructive sleep apnea of adult    Type 2 diabetes mellitus with hyperglycemia, without long-term current use of insulin (CMS/[facility]) - Primary     Dm2:  A1c great  Discussed working on weight.  Given samples to try low dose mounjaro once out of trulicity, he will let " us know how it goes  Discussed dose titration  Htn:  Bp decent  AQUILINO:  Compliant with cpap  Follow up in 6 months    Electronically signed by:  Sloane Reagan MD 02/08/24 11:48 AM

## 2024-03-21 DIAGNOSIS — E11.65 TYPE 2 DIABETES MELLITUS WITH HYPERGLYCEMIA, WITHOUT LONG-TERM CURRENT USE OF INSULIN (MULTI): Primary | ICD-10-CM

## 2024-03-21 RX ORDER — DULAGLUTIDE 3 MG/.5ML
INJECTION, SOLUTION SUBCUTANEOUS
Qty: 6 ML | Refills: 3 | Status: SHIPPED | OUTPATIENT
Start: 2024-03-21

## 2024-04-22 DIAGNOSIS — E11.65 TYPE 2 DIABETES MELLITUS WITH HYPERGLYCEMIA, WITHOUT LONG-TERM CURRENT USE OF INSULIN (MULTI): Primary | ICD-10-CM

## 2024-04-22 RX ORDER — TIRZEPATIDE 5 MG/.5ML
5 INJECTION, SOLUTION SUBCUTANEOUS
Qty: 2 ML | Refills: 0 | Status: SHIPPED | OUTPATIENT
Start: 2024-04-22

## 2024-05-03 DIAGNOSIS — E11.65 TYPE 2 DIABETES MELLITUS WITH HYPERGLYCEMIA, WITHOUT LONG-TERM CURRENT USE OF INSULIN (MULTI): Primary | ICD-10-CM

## 2024-05-03 RX ORDER — TIRZEPATIDE 2.5 MG/.5ML
2.5 INJECTION, SOLUTION SUBCUTANEOUS
Qty: 2 ML | Refills: 1 | Status: SHIPPED | OUTPATIENT
Start: 2024-05-03 | End: 2025-05-03

## 2024-06-17 NOTE — TELEPHONE ENCOUNTER
Please have him check blood pressures with dizzy spells to see if this is related. He should also write them down and bring the log to visit with me in January. Pt reports right sided abdominal pain and cramping for the past week. Pt reports she is 13 weeks pregnant. Pt reports 10/10 pain at this time.    Adult Physical Assessment  LOC: Hema Riley, 22 y.o. female verified via two identifiers.  The patient is awake, alert, oriented and speaking appropriately at this time.  APPEARANCE: Patient resting comfortably and appears to be in no acute distress at this time. Patient is clean and well groomed, patient's clothing is properly fastened.  SKIN:The skin is warm and dry, color consistent with ethnicity, patient has normal skin turgor and moist mucus membranes, skin intact, no breakdown or brusing noted.  MUSCULOSKELETAL: Patient moving all extremities well, no obvious swelling or deformities noted.  RESPIRATORY: Airway is open and patent, respirations are spontaneous, patient has a normal effort and rate, no accessory muscle use noted.  CARDIAC: Patient has a normal rate and rhythm, no periphreal edema noted in any extremity, capillary refill < 3 seconds in all extremities  ABDOMEN: Soft and non tender to palpation, no abdominal distention noted. Abdominal pain and cramping. Pt reports 10/10 pain.   NEUROLOGIC: Eyes open spontaneously, behavior appropriate to situation, follows commands, facial expression symmetrical, bilateral hand grasp equal and even, purposeful motor response noted, normal sensation in all extremities when touched with a finger.

## 2024-07-01 DIAGNOSIS — E11.65 TYPE 2 DIABETES MELLITUS WITH HYPERGLYCEMIA, WITHOUT LONG-TERM CURRENT USE OF INSULIN (MULTI): ICD-10-CM

## 2024-07-01 RX ORDER — TIRZEPATIDE 5 MG/.5ML
5 INJECTION, SOLUTION SUBCUTANEOUS
Qty: 2 ML | Refills: 0 | Status: SHIPPED | OUTPATIENT
Start: 2024-07-01

## 2024-07-12 ENCOUNTER — APPOINTMENT (OUTPATIENT)
Dept: PRIMARY CARE | Facility: CLINIC | Age: 44
End: 2024-07-12
Payer: COMMERCIAL

## 2024-07-12 VITALS
BODY MASS INDEX: 42.86 KG/M2 | TEMPERATURE: 96.8 F | RESPIRATION RATE: 13 BRPM | OXYGEN SATURATION: 98 % | DIASTOLIC BLOOD PRESSURE: 72 MMHG | SYSTOLIC BLOOD PRESSURE: 116 MMHG | WEIGHT: 303 LBS | HEART RATE: 104 BPM

## 2024-07-12 DIAGNOSIS — K21.00 GASTROESOPHAGEAL REFLUX DISEASE WITH ESOPHAGITIS, UNSPECIFIED WHETHER HEMORRHAGE: ICD-10-CM

## 2024-07-12 DIAGNOSIS — B34.9 VIRAL SYNDROME: ICD-10-CM

## 2024-07-12 DIAGNOSIS — Z23 NEED FOR PNEUMOCOCCAL VACCINE: ICD-10-CM

## 2024-07-12 DIAGNOSIS — Z00.00 HEALTHCARE MAINTENANCE: ICD-10-CM

## 2024-07-12 DIAGNOSIS — I10 HYPERTENSION, UNSPECIFIED TYPE: ICD-10-CM

## 2024-07-12 DIAGNOSIS — E11.65 TYPE 2 DIABETES MELLITUS WITH HYPERGLYCEMIA, WITHOUT LONG-TERM CURRENT USE OF INSULIN (MULTI): Primary | ICD-10-CM

## 2024-07-12 DIAGNOSIS — I10 BENIGN ESSENTIAL HYPERTENSION: ICD-10-CM

## 2024-07-12 PROCEDURE — 3044F HG A1C LEVEL LT 7.0%: CPT

## 2024-07-12 PROCEDURE — 3074F SYST BP LT 130 MM HG: CPT

## 2024-07-12 PROCEDURE — 90471 IMMUNIZATION ADMIN: CPT

## 2024-07-12 PROCEDURE — 4010F ACE/ARB THERAPY RXD/TAKEN: CPT

## 2024-07-12 PROCEDURE — 1036F TOBACCO NON-USER: CPT

## 2024-07-12 PROCEDURE — 99213 OFFICE O/P EST LOW 20 MIN: CPT

## 2024-07-12 PROCEDURE — 99396 PREV VISIT EST AGE 40-64: CPT

## 2024-07-12 PROCEDURE — 3078F DIAST BP <80 MM HG: CPT

## 2024-07-12 PROCEDURE — 90677 PCV20 VACCINE IM: CPT

## 2024-07-12 PROCEDURE — 3048F LDL-C <100 MG/DL: CPT

## 2024-07-12 RX ORDER — ESOMEPRAZOLE MAGNESIUM 40 MG/1
40 CAPSULE, DELAYED RELEASE ORAL
Qty: 90 CAPSULE | Refills: 3 | Status: SHIPPED | OUTPATIENT
Start: 2024-07-12 | End: 2024-07-12

## 2024-07-12 RX ORDER — DULOXETIN HYDROCHLORIDE 60 MG/1
60 CAPSULE, DELAYED RELEASE ORAL DAILY
COMMUNITY

## 2024-07-12 RX ORDER — LEVOCETIRIZINE DIHYDROCHLORIDE 2.5 MG/5ML
2.5 SOLUTION ORAL EVERY EVENING
COMMUNITY

## 2024-07-12 RX ORDER — DULOXETIN HYDROCHLORIDE 30 MG/1
30 CAPSULE, DELAYED RELEASE ORAL DAILY
COMMUNITY

## 2024-07-12 RX ORDER — MONTELUKAST SODIUM 10 MG/1
10 TABLET ORAL DAILY
Qty: 90 TABLET | Refills: 3 | Status: SHIPPED | OUTPATIENT
Start: 2024-07-12

## 2024-07-12 RX ORDER — LISINOPRIL 40 MG/1
40 TABLET ORAL DAILY
Qty: 90 TABLET | Refills: 3 | Status: SHIPPED | OUTPATIENT
Start: 2024-07-12

## 2024-07-12 RX ORDER — ESOMEPRAZOLE MAGNESIUM 40 MG/1
40 CAPSULE, DELAYED RELEASE ORAL
Qty: 90 CAPSULE | Refills: 3 | Status: SHIPPED | OUTPATIENT
Start: 2024-07-12

## 2024-07-12 SDOH — ECONOMIC STABILITY: FOOD INSECURITY: WITHIN THE PAST 12 MONTHS, YOU WORRIED THAT YOUR FOOD WOULD RUN OUT BEFORE YOU GOT MONEY TO BUY MORE.: NEVER TRUE

## 2024-07-12 SDOH — ECONOMIC STABILITY: FOOD INSECURITY: WITHIN THE PAST 12 MONTHS, THE FOOD YOU BOUGHT JUST DIDN'T LAST AND YOU DIDN'T HAVE MONEY TO GET MORE.: NEVER TRUE

## 2024-07-12 ASSESSMENT — ANXIETY QUESTIONNAIRES
4. TROUBLE RELAXING: NOT AT ALL
GAD7 TOTAL SCORE: 0
3. WORRYING TOO MUCH ABOUT DIFFERENT THINGS: NOT AT ALL
5. BEING SO RESTLESS THAT IT IS HARD TO SIT STILL: NOT AT ALL
IF YOU CHECKED OFF ANY PROBLEMS ON THIS QUESTIONNAIRE, HOW DIFFICULT HAVE THESE PROBLEMS MADE IT FOR YOU TO DO YOUR WORK, TAKE CARE OF THINGS AT HOME, OR GET ALONG WITH OTHER PEOPLE: NOT DIFFICULT AT ALL
7. FEELING AFRAID AS IF SOMETHING AWFUL MIGHT HAPPEN: NOT AT ALL
1. FEELING NERVOUS, ANXIOUS, OR ON EDGE: NOT AT ALL
2. NOT BEING ABLE TO STOP OR CONTROL WORRYING: NOT AT ALL
6. BECOMING EASILY ANNOYED OR IRRITABLE: NOT AT ALL

## 2024-07-12 ASSESSMENT — ENCOUNTER SYMPTOMS
CONSTITUTIONAL NEGATIVE: 1
NEUROLOGICAL NEGATIVE: 1
CARDIOVASCULAR NEGATIVE: 1
ENDOCRINE NEGATIVE: 1
MUSCULOSKELETAL NEGATIVE: 1
PSYCHIATRIC NEGATIVE: 1
GASTROINTESTINAL NEGATIVE: 1
EYES NEGATIVE: 1
RESPIRATORY NEGATIVE: 1
HEMATOLOGIC/LYMPHATIC NEGATIVE: 1

## 2024-07-12 ASSESSMENT — PATIENT HEALTH QUESTIONNAIRE - PHQ9
2. FEELING DOWN, DEPRESSED OR HOPELESS: NOT AT ALL
SUM OF ALL RESPONSES TO PHQ9 QUESTIONS 1 & 2: 0
1. LITTLE INTEREST OR PLEASURE IN DOING THINGS: NOT AT ALL

## 2024-07-12 ASSESSMENT — LIFESTYLE VARIABLES
HOW OFTEN DO YOU HAVE SIX OR MORE DRINKS ON ONE OCCASION: NEVER
AUDIT-C TOTAL SCORE: 0
SKIP TO QUESTIONS 9-10: 1
HOW MANY STANDARD DRINKS CONTAINING ALCOHOL DO YOU HAVE ON A TYPICAL DAY: PATIENT DOES NOT DRINK
HOW OFTEN DO YOU HAVE A DRINK CONTAINING ALCOHOL: NEVER

## 2024-07-12 ASSESSMENT — PAIN SCALES - GENERAL: PAINLEVEL: 5

## 2024-07-12 NOTE — PROGRESS NOTES
Subjective   Patient ID: Elie Mendenhall is a 44 y.o. male who presents for follow up of HTN and GERD.    Continues on vitamin D supplement most days, sometimes forgetting to take.     Diet: Mostly chicken and chicken and soup, chicken and rice, broccoli, riced cauliflower. [place] for breakfast, smart carb. Carb controlled diet. 3-4 cans diet soda per day  Exercise: No regular exercise  Weight: Down 30lbs since 2/2023  Water: Drinking about 60 oz per day  Sleep: Good sleep, using CPAP nightly. Getting about 8-9 hours per night  Social: , lives in a 2 floor home, no issues with stairs. No children, no pets  Professional: Working full time in /manager    Review of Systems   Constitutional: Negative.    HENT: Negative.     Eyes: Negative.    Respiratory: Negative.     Cardiovascular: Negative.    Gastrointestinal: Negative.    Endocrine: Negative.    Genitourinary: Negative.    Musculoskeletal: Negative.    Skin: Negative.    Neurological: Negative.    Hematological: Negative.    Psychiatric/Behavioral: Negative.          Current Outpatient Medications   Medication Sig Dispense Refill    ARIPiprazole (Abilify) 5 mg tablet Take 1 tablet (5 mg) by mouth once daily.      DULoxetine (Cymbalta) 60 mg DR capsule Take 1 capsule (60 mg) by mouth once daily. Do not crush or chew.      empagliflozin (Jardiance) 10 mg Take 1 tablet (10 mg) by mouth once daily. 90 tablet 3    FreeStyle Lancets 28 gauge 1 each if needed.      FreeStyle Lite Strips strip TEST THREE TIMES A DAY      insulin degludec (Tresiba FlexTouch U-100) 100 unit/mL (3 mL) injection Inject 80 Units under the skin once daily. Take as directed per insulin instructions. 72 mL 3    levocetirizine (Xyzal) 2.5 mg/5 mL solution Take 5 mL (2.5 mg) by mouth once daily in the evening.      meclizine (Antivert) 25 mg tablet Take 1 tablet (25 mg) by mouth 3 times a day as needed for dizziness. 30 tablet 1    metFORMIN  mg 24 hr tablet Take 1 tablet (500  "mg) by mouth 2 times a day with meals. 180 tablet 3    pen needle, diabetic (BD Taylor 2nd Gen Pen Needle) 32 gauge x 5/32\" needle Use once daily with insulin 100 each 3    rosuvastatin (Crestor) 5 mg tablet Take 1 tablet (5 mg) by mouth once daily. 90 tablet 3    tirzepatide (Mounjaro) 5 mg/0.5 mL pen injector Inject 5 mg under the skin every 7 days. 2 mL 0    DULoxetine (Cymbalta) 30 mg DR capsule Take 1 capsule (30 mg) by mouth once daily. Do not crush or chew.      esomeprazole (NexIUM) 40 mg DR capsule Take 1 capsule (40 mg) by mouth once daily in the morning. Take before meals. 90 capsule 3    lisinopril 40 mg tablet Take 1 tablet (40 mg) by mouth once daily. 90 tablet 3    montelukast (Singulair) 10 mg tablet Take 1 tablet (10 mg) by mouth once daily. 90 tablet 3     No current facility-administered medications for this visit.     Past Surgical History:   Procedure Laterality Date    OTHER SURGICAL HISTORY  04/04/2016    Ant Spinal Diskect Osteophytect Lumb Interspace Microdiscect    OTHER SURGICAL HISTORY  01/30/2020    Corneal lasik    OTHER SURGICAL HISTORY  01/30/2020    Tonsillectomy     Family History   Problem Relation Name Age of Onset    Ovarian cancer Mother      Arthritis Father      Depression Father      Other (stroke syndrome) Other        Social History     Tobacco Use    Smoking status: Never    Smokeless tobacco: Never   Vaping Use    Vaping status: Former   Substance Use Topics    Alcohol use: Not Currently    Drug use: Never        Objective     Visit Vitals  /72 (BP Location: Left arm, Patient Position: Sitting)   Pulse 104   Temp 36 °C (96.8 °F) (Temporal)   Resp 13   Wt 137 kg (303 lb)   SpO2 98%   BMI 42.86 kg/m²   Smoking Status Never   BSA 2.61 m²        Physical Exam      Assessment/Plan   Problem List Items Addressed This Visit       Benign essential hypertension     Normotensive in office at 116/72  Home blood pressures much the same    Continue lisinopril 40mg daily         " GERD (gastroesophageal reflux disease)    Relevant Medications    esomeprazole (NexIUM) 40 mg DR capsule    Type 2 diabetes mellitus with hyperglycemia, without long-term current use of insulin (Multi) - Primary     Following with Dr. Reagan for DM  Most recent A1c from 1/2024 well controlled at  6.2%    Continue current medications and regular follow up with endocrinology         Relevant Orders    Albumin-Creatinine Ratio, Urine Random    Healthcare maintenance     -Healthy diet, good quality and duration of sleep, healthy water intake, regular exercise and healthy weight discussed  -Immunizations:   Flu: Recommended annually  Shingrix: N/.A   Tdap: Recommended  PCV 20: Recommended d/t DM2  -Following with dentistry and optometry regularly  -Colon cancer screening: N.A  -No concerns for anxiety/depression  -Tobacco never, EtOH rare, No illicit/recreational drugs  -Feeling safe at home           Other Visit Diagnoses       Hypertension, unspecified type        Relevant Medications    lisinopril 40 mg tablet    Viral syndrome        Relevant Medications    montelukast (Singulair) 10 mg tablet    Need for pneumococcal vaccine        Relevant Orders    Pneumococcal conjugate vaccine, 20-valent (PREVNAR 20)            All pertinent lab work and results were reviewed with patient.     Follow up with me in 6 months    MICHELLE Virgen-CNS

## 2024-07-19 DIAGNOSIS — E11.65 TYPE 2 DIABETES MELLITUS WITH HYPERGLYCEMIA, WITHOUT LONG-TERM CURRENT USE OF INSULIN (MULTI): Primary | ICD-10-CM

## 2024-07-30 DIAGNOSIS — E11.65 TYPE 2 DIABETES MELLITUS WITH HYPERGLYCEMIA, WITHOUT LONG-TERM CURRENT USE OF INSULIN (MULTI): Primary | ICD-10-CM

## 2024-07-30 RX ORDER — TIRZEPATIDE 7.5 MG/.5ML
7.5 INJECTION, SOLUTION SUBCUTANEOUS
Qty: 2 ML | Refills: 0 | Status: SHIPPED | OUTPATIENT
Start: 2024-07-30

## 2024-07-31 ENCOUNTER — LAB (OUTPATIENT)
Dept: LAB | Facility: LAB | Age: 44
End: 2024-07-31
Payer: COMMERCIAL

## 2024-07-31 DIAGNOSIS — E11.65 TYPE 2 DIABETES MELLITUS WITH HYPERGLYCEMIA, WITHOUT LONG-TERM CURRENT USE OF INSULIN (MULTI): ICD-10-CM

## 2024-07-31 LAB
ALBUMIN SERPL BCP-MCNC: 4.4 G/DL (ref 3.4–5)
ALP SERPL-CCNC: 74 U/L (ref 33–120)
ALT SERPL W P-5'-P-CCNC: 42 U/L (ref 10–52)
ANION GAP SERPL CALC-SCNC: 11 MMOL/L (ref 10–20)
AST SERPL W P-5'-P-CCNC: 23 U/L (ref 9–39)
BILIRUB SERPL-MCNC: 0.5 MG/DL (ref 0–1.2)
BUN SERPL-MCNC: 19 MG/DL (ref 6–23)
CALCIUM SERPL-MCNC: 9.4 MG/DL (ref 8.6–10.3)
CHLORIDE SERPL-SCNC: 100 MMOL/L (ref 98–107)
CO2 SERPL-SCNC: 31 MMOL/L (ref 21–32)
CREAT SERPL-MCNC: 1.04 MG/DL (ref 0.5–1.3)
EGFRCR SERPLBLD CKD-EPI 2021: >90 ML/MIN/1.73M*2
EST. AVERAGE GLUCOSE BLD GHB EST-MCNC: 137 MG/DL
GLUCOSE SERPL-MCNC: 94 MG/DL (ref 74–99)
HBA1C MFR BLD: 6.4 %
POTASSIUM SERPL-SCNC: 4.1 MMOL/L (ref 3.5–5.3)
PROT SERPL-MCNC: 7 G/DL (ref 6.4–8.2)
SODIUM SERPL-SCNC: 138 MMOL/L (ref 136–145)

## 2024-07-31 PROCEDURE — 80053 COMPREHEN METABOLIC PANEL: CPT

## 2024-07-31 PROCEDURE — 83036 HEMOGLOBIN GLYCOSYLATED A1C: CPT

## 2024-07-31 PROCEDURE — 36415 COLL VENOUS BLD VENIPUNCTURE: CPT

## 2024-08-02 ENCOUNTER — APPOINTMENT (OUTPATIENT)
Dept: ENDOCRINOLOGY | Facility: CLINIC | Age: 44
End: 2024-08-02
Payer: COMMERCIAL

## 2024-08-02 ENCOUNTER — TELEPHONE (OUTPATIENT)
Dept: PRIMARY CARE | Facility: CLINIC | Age: 44
End: 2024-08-02

## 2024-08-02 VITALS
SYSTOLIC BLOOD PRESSURE: 112 MMHG | HEIGHT: 71 IN | WEIGHT: 301.4 LBS | BODY MASS INDEX: 42.19 KG/M2 | HEART RATE: 76 BPM | DIASTOLIC BLOOD PRESSURE: 74 MMHG | RESPIRATION RATE: 16 BRPM

## 2024-08-02 DIAGNOSIS — I10 BENIGN ESSENTIAL HYPERTENSION: ICD-10-CM

## 2024-08-02 DIAGNOSIS — R04.0 BLEEDING FROM THE NOSE: ICD-10-CM

## 2024-08-02 DIAGNOSIS — G47.33 OBSTRUCTIVE SLEEP APNEA OF ADULT: ICD-10-CM

## 2024-08-02 DIAGNOSIS — E11.65 TYPE 2 DIABETES MELLITUS WITH HYPERGLYCEMIA, WITHOUT LONG-TERM CURRENT USE OF INSULIN (MULTI): Primary | ICD-10-CM

## 2024-08-02 PROCEDURE — 1036F TOBACCO NON-USER: CPT | Performed by: INTERNAL MEDICINE

## 2024-08-02 PROCEDURE — 99214 OFFICE O/P EST MOD 30 MIN: CPT | Performed by: INTERNAL MEDICINE

## 2024-08-02 PROCEDURE — 3048F LDL-C <100 MG/DL: CPT | Performed by: INTERNAL MEDICINE

## 2024-08-02 PROCEDURE — 4010F ACE/ARB THERAPY RXD/TAKEN: CPT | Performed by: INTERNAL MEDICINE

## 2024-08-02 PROCEDURE — 3008F BODY MASS INDEX DOCD: CPT | Performed by: INTERNAL MEDICINE

## 2024-08-02 PROCEDURE — 3044F HG A1C LEVEL LT 7.0%: CPT | Performed by: INTERNAL MEDICINE

## 2024-08-02 PROCEDURE — 3074F SYST BP LT 130 MM HG: CPT | Performed by: INTERNAL MEDICINE

## 2024-08-02 PROCEDURE — 3078F DIAST BP <80 MM HG: CPT | Performed by: INTERNAL MEDICINE

## 2024-08-02 ASSESSMENT — ENCOUNTER SYMPTOMS
DIARRHEA: 0
SHORTNESS OF BREATH: 0
VOMITING: 0
LIGHT-HEADEDNESS: 0
DIZZINESS: 0
NAUSEA: 0
FEVER: 0
CHILLS: 0

## 2024-08-02 NOTE — PROGRESS NOTES
Endocrinology: Follow up visit  Subjective   Patient ID: Elie Mendenhall is a 44 y.o. male who presents for Diabetes (Type 2 ) and Hypertension.    PCP: MICHELLE Virgen-CNS    HPI  Since last visit switched to mounjaro.  Slowly increasing dose due to stocking.  Sugars up a bit but still excellent.  No tolerance issues.  Weight down 9 lbs from last time.  Only recent issue is recurrent nosebleeds, had them as a child and seem to have come back lately.    Review of Systems   Constitutional:  Negative for chills and fever.   Respiratory:  Negative for shortness of breath.    Gastrointestinal:  Negative for diarrhea, nausea and vomiting.   Endocrine: Negative for cold intolerance and heat intolerance.   Neurological:  Negative for dizziness and light-headedness.       Patient Active Problem List   Diagnosis    Benign essential hypertension    Breast mass in male    Decreased libido without sexual dysfunction    Neuropathy    Nasal obstruction    Low testosterone in male    GERD (gastroesophageal reflux disease)    Eosinophilic esophagitis    Environmental and seasonal allergies    Obstructive sleep apnea of adult    Recurrent major depressive disorder, in partial remission (CMS-[facility])    Type 2 diabetes mellitus with hyperglycemia, without long-term current use of insulin (Multi)    Vitamin D deficiency    Dizziness    Healthcare maintenance        Home Meds:  Current Outpatient Medications   Medication Instructions    ARIPiprazole (ABILIFY) 5 mg, oral, Daily    DULoxetine (CYMBALTA) 60 mg, oral, Daily, Do not crush or chew.    DULoxetine (CYMBALTA) 30 mg, oral, Daily, Do not crush or chew.    empagliflozin (JARDIANCE) 10 mg, oral, Daily    esomeprazole (NEXIUM) 40 mg, oral, Daily before breakfast    FreeStyle Lancets 28 gauge 1 each, miscellaneous, As needed    FreeStyle Lite Strips strip TEST THREE TIMES A DAY    insulin degludec (TRESIBA FLEXTOUCH U-100) 80 Units, subcutaneous, Daily, Take as directed per insulin  "instructions.    levocetirizine (XYZAL) 2.5 mg, oral, Every evening    lisinopril 40 mg, oral, Daily    meclizine (ANTIVERT) 25 mg, oral, 3 times daily PRN    metFORMIN XR (GLUCOPHAGE-XR) 500 mg, oral, 2 times daily (morning and late afternoon)    montelukast (SINGULAIR) 10 mg, oral, Daily    Mounjaro 5 mg, subcutaneous, Every 7 days    Mounjaro 7.5 mg, subcutaneous, Every 7 days    pen needle, diabetic (BD Taylor 2nd Gen Pen Needle) 32 gauge x 5/32\" needle Use once daily with insulin    rosuvastatin (CRESTOR) 5 mg, oral, Daily        Allergies   Allergen Reactions    Egg Other    Cefaclor Hives        Objective   Vitals:    08/02/24 0950   BP: 112/74   Pulse: 76   Resp: 16      Vitals:    08/02/24 0950   Weight: 137 kg (301 lb 6.4 oz)      Body mass index is 42.64 kg/m².   Physical Exam  Constitutional:       Appearance: Normal appearance. He is overweight.   HENT:      Head: Normocephalic and atraumatic.   Neck:      Thyroid: No thyroid mass, thyromegaly or thyroid tenderness.   Cardiovascular:      Rate and Rhythm: Normal rate and regular rhythm.      Heart sounds: No murmur heard.     No gallop.   Pulmonary:      Effort: Pulmonary effort is normal.      Breath sounds: Normal breath sounds.   Abdominal:      Palpations: Abdomen is soft.      Comments: benign   Neurological:      General: No focal deficit present.      Mental Status: He is alert and oriented to person, place, and time.      Deep Tendon Reflexes: Reflexes are normal and symmetric.   Psychiatric:         Behavior: Behavior is cooperative.         Labs:  Lab Results   Component Value Date    HGBA1C 6.4 (H) 07/31/2024    TSH 2.19 10/19/2021      No results found for: \"PR1\", \"THYROIDPAB\", \"TSI\"     Assessment/Plan   Assessment & Plan  Type 2 diabetes mellitus with hyperglycemia, without long-term current use of insulin (Multi)    Sugars are great  Discussed continuing to adjust mounjaro  Fasting labs next time    Benign essential hypertension    Bp " excellent  Obstructive sleep apnea of adult    Compliant with cpap  Working on weight    Will refer to ent for nosebleeds, may need cautery  Bleeding from the nose    Orders:    Referral to ENT; Future        Electronically signed by:  Sloane Reagan MD 08/02/24 9:51 AM

## 2024-08-15 NOTE — PATIENT INSTRUCTIONS
PATIENT INSTRUCTIONS ARE COMPLETE and READY TO PRINT    NOSEBLEED PREVENTION:  For the next month, please refrain from blowing your nose. You can lightly sniff back and spit out. Please do not stifle any sneezes. If you must sneeze, then try to sneeze through an open mouth. Please do not stick anything in your nose other than any medicine or irrigations we recommend. Please do not insert a Q-tip or finger in the nose at this time as this can cause further trauma. Try to keep your head above your heart for the next week. Please refrain from any activities that will increase your heart rate or blood pressure for at least 2 weeks (this includes excessive straining on the toilet). Please refrain from lifting heavy objects greater than 10 lbs for 2 weeks.    Do not stick anything in your nose other than any medicine or irrigations we recommend. Please do not pick your nose as this will cause the bleeding.    Mupirocin ointment:  Please start using using mupirocin ointment in the nose. Apply it to each nostril 2-3 times daily until your next appointment with Dr. Varner. Use the pad of your finger or thumb to apply the ointment to the front of each nostril and then sniff back gently. Do not use a Q-tip or fingernail to place the ointment as this will cause further trauma. If the prescribed ointment is too expensive, then just use over-the-counter Bacitracin or triple antibiotic ointment.    How to Control a Nosebleed:  If you get a mild nosebleed, try pinching your nostrils together for 10 minutes across the soft part near the bottom of your nose.  If the nosebleed persists or worsens, please squirt 3-4 sprays of an over-the-counter nasal decongestant (such as Afrin, oxymetazoline, or Sameer-Synephrine) into the bleeding nostril, and pinch your nostrils together for 10 minutes.  If bleeding still continues, squirt 3-4 more sprays of the decongestant into the bleeding nostril, and pinch your nostrils together for another 10  minutes.  If the nosebleed does not stop after 3 attempts with pinching and the decongestant spray, call our office at [phone]. If you are unable to get in touch with us, please go to the Emergency Room.    Humidifier:  You can try using a humidifier in your bedroom and other rooms.    Follow up:  Please follow up with me in 1 months for reevaluation or sooner with any questions or concerns. Please feel free to contact my office by calling [phone] with any questions.  ______________________________________________________________________________________    Welcome to Dr. Robert Eugene’s clinic. We are here to assist you through your ENT care at [facility]. Dr. Robert Eugene is a Rhinologist who is an expert with advanced fellowship training in Endoscopic Sinus Surgery and Skull Base Surgery.    Dr. Robert Eugene’s office number is [phone]. Please use this number to contact his care team regardless of which office you use to access care. This number is the most direct way to communicate with all the members of the care team.    Vicky Lance CNP and Maritza Aguilar CNP are nurse practitioners who are a part of Dr. Eugene’s team. They will work collaboratively with Dr. Eugene to meet your goals. This often may include seeing you for more urgent appointments or follow-up visits. They follow the same protocols and guidelines for chronic management of your condition.    Luz Nicole RN is Dr. Eugene’s primary nurse. She can be reached by calling the office as well. Luz is in clinic Monday through Friday. Non-urgent calls will be returned within 24 hours of the call.    Miracle Cabrera is Dr. Eugene’s  and she answers the office phone from 9am-4pm Mon-Thurs. On Friday, she answers the phone from 9am-3pm. Call [phone]. She can help you with scheduling of appointments, general questions and information. You may need to leave a message if she is helping another patient. In this case,  someone from the team will call you back the same day if you leave your message before 3pm, or the next business morning if after 3pm.    For your convenience, Dr. Eugene sees patients at different [facility] locations including the [facility] and at [facility]. While we try to make your appointments as convenient as possible, occasionally a visit to another location may be necessary to provide the best care for you.    Dr. Eugene makes every effort to run on time for your appointments. Therefore, if you are more than 15 to 20 minutes late, your appointment may need to be rescheduled to another day. We appreciate your understanding.    We look forward to working with you to meet your healthcare goals.    Scribe Attestation  By signing my name below, I, Bernardo Pineda, attest that this documentation has been prepared under the direction and in the presence of Robert Varner MD. All medical record entries made by the Scribe were at my direction or personally dictated by me. I have reviewed the chart and agree that the record accurately reflects my personal performance of the history, physical exam, discussion and plan.

## 2024-08-15 NOTE — PROGRESS NOTES
Sinus & Skull Base Surgery    Chief Concern: Left epistaxis    HPI   Elie Mendenhall is a 44 y.o. male, referred by Dr. Sloane Reagan, for concerns of epistaxis. He has had about 6 nose bleeds from the left side within the last month. They typically last 30 minutes on average. Last episode of epistaxis was this morning. He was writing an email when the nosebleed occurred, lasting for 10 minutes this time and resolved by holding pressure. He has never used Afrin for bleeding. He thinks he broke his nose in childhood. He denies any recent nasal trauma. He reports frequent nosebleeds in childhood. He states that he has had bilateral cautery multiple times in the past. Last cautery was over 10 years ago. He has been to the ER previously for epistaxis. He also endorses bilateral nasal obstruction and bilateral rhinorrhea for the past 2 years. Denies decreased sense of smell. Wears CPAP for AQUILINO. Does have seasonal allergies, takes OTC antihistamine. He is not currently on any nasal sprays other than occasional saline mist. Has used Flonase in the past but this caused bleeding. Has tried saline rinses, did not like these. Denies anticoagulants. Takes lisinopril for BP control. He denies history of significant exposure to toxic fumes, nickel, or wood dust.    PMH: HTN  History of prior nasal/sinus surgery or procedure: Denies    Past Medical History  He has a past medical history of Acute sinusitis, unspecified (01/30/2020), Chronic sinusitis, unspecified (04/18/2016), Cutaneous abscess of perineum (05/01/2020), Decreased libido (05/04/2020), Exposure to laser radiation, initial encounter (01/30/2020), Other conditions influencing health status (05/06/2022), Other specified abnormal findings of blood chemistry (05/04/2020), Other symptoms and signs involving the nervous system (05/05/2020), Personal history of other diseases of the nervous system and sense organs (05/04/2020), Personal history of other diseases of  the respiratory system (06/15/2016), Personal history of other diseases of the respiratory system (06/15/2016), Personal history of other diseases of the respiratory system (01/30/2020), Personal history of other diseases of the respiratory system (03/25/2016), Personal history of other diseases of the respiratory system (04/18/2016), Personal history of other infectious and parasitic diseases (01/30/2020), Snoring (05/04/2020), and Unspecified acute lower respiratory infection (05/17/2022).    Patient Active Problem List    Diagnosis Date Noted    Healthcare maintenance 07/12/2024    Dizziness 01/10/2024    Benign essential hypertension 05/15/2023    Breast mass in male 05/15/2023    Decreased libido without sexual dysfunction 05/15/2023    Neuropathy 05/15/2023    Nasal obstruction 05/15/2023    Low testosterone in male 05/15/2023    GERD (gastroesophageal reflux disease) 05/15/2023    Eosinophilic esophagitis 05/15/2023    Environmental and seasonal allergies 05/15/2023    Obstructive sleep apnea of adult 05/15/2023    Recurrent major depressive disorder, in partial remission (CMS-HCC) 05/15/2023    Type 2 diabetes mellitus with hyperglycemia, without long-term current use of insulin (Multi) 05/15/2023    Vitamin D deficiency 05/15/2023     Surgical History  He has a past surgical history that includes Other surgical history (04/04/2016); Other surgical history (01/30/2020); and Other surgical history (01/30/2020).    Social History  He reports that he has never smoked. He has never used smokeless tobacco. He reports that he does not currently use alcohol. He reports that he does not use drugs.    Family History  Family History   Problem Relation Name Age of Onset    Ovarian cancer Mother      Arthritis Father      Depression Father      Other (stroke syndrome) Other       Allergies  Egg and Cefaclor    Medications  Current Outpatient Medications:     ARIPiprazole (Abilify) 5 mg tablet, Take 1 tablet (5 mg) by  "mouth once daily., Disp: , Rfl:     DULoxetine (Cymbalta) 30 mg DR capsule, Take 1 capsule (30 mg) by mouth once daily. Do not crush or chew., Disp: , Rfl:     DULoxetine (Cymbalta) 60 mg DR capsule, Take 1 capsule (60 mg) by mouth once daily. Do not crush or chew., Disp: , Rfl:     empagliflozin (Jardiance) 10 mg, Take 1 tablet (10 mg) by mouth once daily., Disp: 90 tablet, Rfl: 3    esomeprazole (NexIUM) 40 mg DR capsule, Take 1 capsule (40 mg) by mouth once daily in the morning. Take before meals., Disp: 90 capsule, Rfl: 3    FreeStyle Lancets 28 gauge, 1 each if needed., Disp: , Rfl:     FreeStyle Lite Strips strip, TEST THREE TIMES A DAY, Disp: , Rfl:     insulin degludec (Tresiba FlexTouch U-100) 100 unit/mL (3 mL) injection, Inject 80 Units under the skin once daily. Take as directed per insulin instructions., Disp: 72 mL, Rfl: 3    levocetirizine (Xyzal) 2.5 mg/5 mL solution, Take 5 mL (2.5 mg) by mouth once daily in the evening., Disp: , Rfl:     lisinopril 40 mg tablet, Take 1 tablet (40 mg) by mouth once daily., Disp: 90 tablet, Rfl: 3    meclizine (Antivert) 25 mg tablet, Take 1 tablet (25 mg) by mouth 3 times a day as needed for dizziness., Disp: 30 tablet, Rfl: 1    metFORMIN  mg 24 hr tablet, Take 1 tablet (500 mg) by mouth 2 times a day with meals., Disp: 180 tablet, Rfl: 3    montelukast (Singulair) 10 mg tablet, Take 1 tablet (10 mg) by mouth once daily., Disp: 90 tablet, Rfl: 3    pen needle, diabetic (BD Taylor 2nd Gen Pen Needle) 32 gauge x 5/32\" needle, Use once daily with insulin, Disp: 100 each, Rfl: 3    rosuvastatin (Crestor) 5 mg tablet, Take 1 tablet (5 mg) by mouth once daily., Disp: 90 tablet, Rfl: 3    tirzepatide (Mounjaro) 7.5 mg/0.5 mL pen injector, Inject 7.5 mg under the skin every 7 days., Disp: 2 mL, Rfl: 0    Review of Systems   Negative for constitutional, eyes, cardiac, pulmonary, hepatic, renal, digestive, hematologic, epileptic, syncopal, musculo-skeletal, mental " health, integumentary, hypertensive, lipid, arthritic, diabetic, thyroid or neurologic disorders (except as listed in the HPI, PMH and Problem List).    Assessment   Elie Mendenhall is a 44 y.o. male with symptoms of frequent nosebleeds, bilateral nasal obstruction, bilateral rhinorrhea, and clinical findings consistent with left epistaxis.  8/16/24 - Left anterior septum with dryness and prominent vessels. Rx mupirocin ointment. Rec nasal precautions for 2-4 weeks.    Plan   Reassurance and counseling was provided to the patient, and his condition was extensively discussed, including as noted below:    Nasal endoscopy. Findings: as noted.  Patient was prescribed mupirocin antibiotic ointment to use 2-3 times daily for nasal moisturization. Patient was instructed to swipe a pea-sized amount into anterior nares and sniff back. Patient was instructed to avoid applying it with Q-tips and use it after any nasal sprays or irrigations.  Patient was instructed to avoid nose blowing, strenuous physical activity, and lifting objects greater than 10 lbs for the next 2-4 weeks.  Discussed epistaxis prevention and acute treatment - Afrin spray PRN for bleeding, apply pressure for 10 minutes if bleeding occurs.  Follow up with me in 1 month. We can address his PND and rhinorrhea at this visit if epistaxis has resolved vs cauterize if not.    Referring Provider: Sloane Reagan MD  I will provide a report to the referring provider via the electronic medical record or US mail.    Robert Varner MD [facility]  Division of Rhinology, Sinus, and Skull Base Surgery       Exam   General: This is a healthy appearing male who appears his stated age. The patient is alert and appropriately verbally conversant without hoarseness.    Face: The face was inspected and no cutaneous masses or lesions were visualized. There was no erythema or edema noted. Facial movement was symmetric without weakness. No skin lesions were detected. There was no sinus  tenderness elicited. The parotid and submandibular glands were normal to palpation.    Eyes: Extra-ocular muscle function was intact. No nystagmus was observed. Pupils were equal.    Cranial Nerves: Cranial nerves II, III, IV, and VI were noted to be intact via extra-ocular muscle movement testing. Cranial nerve VII noted to be intact and symmetric by facial movement. Cranial nerve VIII was tested with whispered voice examination and revealed symmetric hearing. Cranial nerves IX and X noted to be intact by gag reflex and palatal movement. Cranial nerve XII noted to be intact by active and symmetric tongue movement.    Nose: Examination of the nose revealed the nasal dorsum to be midline. The septum is deviated to the left anteriorly. The right septum is with prominent vessels, as is the left anterior septum, none of which are bleeding. There is a small area of crusting along the left anterior septum. The inferior turbinates are normal.    Oral Cavity: Examination of the oral cavity revealed no mass lesions nor infection. The palate was noted to be intact without evidence of clefting. The tongue exhibited normal mobility. Mucosa was moist without lesion. The lips were free of lesion. Gums were free of inflammation. Dentition: normal without obvious infection or inflammation.    Oropharynx: The oral pharynx was free of mass lesion or mucosal abnormality. The palate was noted to be without lesion. The uvula was normal appearing. The tonsils were normal appearing.    Ears: Examination of the ears revealed that the auricles were normally formed with no lesions. The external auditory canals were cleaned of any obstructing cerumen. The tympanic membranes were intact and freely mobile to pneumatoscopy. There are no significant retraction pockets. There is no inflammation visualized. No effusions are seen.    Neck: Visualization and palpation of the neck revealed no mass lesions, no thyromegaly or thyroid masses. No skin  lesions or inflammatory processes were detected. The cervical musculature was normal to palpation.    Cervical Lymphatics: There were no palpable lymph nodes in the posterior triangle, submandibular triangle, jugulodigastric region, or central neck.    CV: peripheral perfusion intact, no cyanosis, clubbing or edema of extremities.    Respiratory: Normal inspiration and expiration and chest wall expansion, no use of accessory muscles to breathe, no stridor or stertor.       Scribe Attestation  By signing my name below, I, Bernardo Pineda, attest that this documentation has been prepared under the direction and in the presence of Robert Varner MD. All medical record entries made by the Bernardo were at my direction or personally dictated by me. I have reviewed the chart and agree that the record accurately reflects my personal performance of the history, physical exam, discussion and plan.

## 2024-08-16 ENCOUNTER — OFFICE VISIT (OUTPATIENT)
Dept: OTOLARYNGOLOGY | Facility: CLINIC | Age: 44
End: 2024-08-16
Payer: COMMERCIAL

## 2024-08-16 VITALS — HEIGHT: 70 IN | WEIGHT: 301.9 LBS | BODY MASS INDEX: 43.22 KG/M2 | TEMPERATURE: 97.9 F

## 2024-08-16 DIAGNOSIS — R04.0 EPISTAXIS: Primary | ICD-10-CM

## 2024-08-16 DIAGNOSIS — J34.89 NASAL DRYNESS: ICD-10-CM

## 2024-08-16 DIAGNOSIS — R04.0 BLEEDING FROM THE NOSE: ICD-10-CM

## 2024-08-16 PROCEDURE — 4010F ACE/ARB THERAPY RXD/TAKEN: CPT | Performed by: OTOLARYNGOLOGY

## 2024-08-16 PROCEDURE — 1036F TOBACCO NON-USER: CPT | Performed by: OTOLARYNGOLOGY

## 2024-08-16 PROCEDURE — 3048F LDL-C <100 MG/DL: CPT | Performed by: OTOLARYNGOLOGY

## 2024-08-16 PROCEDURE — 3008F BODY MASS INDEX DOCD: CPT | Performed by: OTOLARYNGOLOGY

## 2024-08-16 PROCEDURE — 3044F HG A1C LEVEL LT 7.0%: CPT | Performed by: OTOLARYNGOLOGY

## 2024-08-16 PROCEDURE — 99204 OFFICE O/P NEW MOD 45 MIN: CPT | Performed by: OTOLARYNGOLOGY

## 2024-08-16 RX ORDER — MUPIROCIN 20 MG/G
1 OINTMENT TOPICAL
Qty: 30 G | Refills: 0 | Status: SHIPPED | OUTPATIENT
Start: 2024-08-16 | End: 2024-09-15

## 2024-08-16 ASSESSMENT — PATIENT HEALTH QUESTIONNAIRE - PHQ9
SUM OF ALL RESPONSES TO PHQ9 QUESTIONS 1 AND 2: 0
2. FEELING DOWN, DEPRESSED OR HOPELESS: NOT AT ALL
1. LITTLE INTEREST OR PLEASURE IN DOING THINGS: NOT AT ALL

## 2024-08-26 DIAGNOSIS — E11.65 TYPE 2 DIABETES MELLITUS WITH HYPERGLYCEMIA, WITHOUT LONG-TERM CURRENT USE OF INSULIN (MULTI): Primary | ICD-10-CM

## 2024-08-26 RX ORDER — TIRZEPATIDE 10 MG/.5ML
10 INJECTION, SOLUTION SUBCUTANEOUS
Qty: 2 ML | Refills: 0 | Status: SHIPPED | OUTPATIENT
Start: 2024-09-01 | End: 2025-09-01

## 2024-09-05 ENCOUNTER — PATIENT MESSAGE (OUTPATIENT)
Dept: PRIMARY CARE | Facility: CLINIC | Age: 44
End: 2024-09-05
Payer: COMMERCIAL

## 2024-09-05 DIAGNOSIS — K21.00 GASTROESOPHAGEAL REFLUX DISEASE WITH ESOPHAGITIS, UNSPECIFIED WHETHER HEMORRHAGE: ICD-10-CM

## 2024-09-09 RX ORDER — ESOMEPRAZOLE MAGNESIUM 40 MG/1
40 CAPSULE, DELAYED RELEASE ORAL
Qty: 90 CAPSULE | Refills: 3 | Status: SHIPPED | OUTPATIENT
Start: 2024-09-09

## 2024-09-13 ENCOUNTER — APPOINTMENT (OUTPATIENT)
Dept: OTOLARYNGOLOGY | Facility: CLINIC | Age: 44
End: 2024-09-13
Payer: COMMERCIAL

## 2024-09-25 DIAGNOSIS — E11.65 TYPE 2 DIABETES MELLITUS WITH HYPERGLYCEMIA, WITHOUT LONG-TERM CURRENT USE OF INSULIN: Primary | ICD-10-CM

## 2024-09-25 RX ORDER — TIRZEPATIDE 12.5 MG/.5ML
12.5 INJECTION, SOLUTION SUBCUTANEOUS
Qty: 2 ML | Refills: 2 | Status: SHIPPED | OUTPATIENT
Start: 2024-09-25

## 2024-09-25 RX ORDER — TIRZEPATIDE 10 MG/.5ML
10 INJECTION, SOLUTION SUBCUTANEOUS
Qty: 2 ML | Refills: 0 | Status: SHIPPED | OUTPATIENT
Start: 2024-09-29 | End: 2024-09-25 | Stop reason: WASHOUT

## 2024-09-26 ENCOUNTER — APPOINTMENT (OUTPATIENT)
Dept: OTOLARYNGOLOGY | Facility: CLINIC | Age: 44
End: 2024-09-26
Payer: COMMERCIAL

## 2024-10-12 DIAGNOSIS — E11.65 TYPE 2 DIABETES MELLITUS WITH HYPERGLYCEMIA, WITHOUT LONG-TERM CURRENT USE OF INSULIN: ICD-10-CM

## 2024-10-12 RX ORDER — TIRZEPATIDE 15 MG/.5ML
15 INJECTION, SOLUTION SUBCUTANEOUS
Qty: 6 ML | Refills: 3 | Status: SHIPPED | OUTPATIENT
Start: 2024-10-12

## 2024-10-12 RX ORDER — ROSUVASTATIN CALCIUM 5 MG/1
5 TABLET, COATED ORAL DAILY
Qty: 90 TABLET | Refills: 3 | Status: SHIPPED | OUTPATIENT
Start: 2024-10-12 | End: 2025-10-12

## 2024-12-20 ENCOUNTER — OFFICE VISIT (OUTPATIENT)
Dept: SURGERY | Facility: CLINIC | Age: 44
End: 2024-12-20
Payer: COMMERCIAL

## 2024-12-20 VITALS
DIASTOLIC BLOOD PRESSURE: 81 MMHG | WEIGHT: 301 LBS | BODY MASS INDEX: 43.19 KG/M2 | HEART RATE: 108 BPM | SYSTOLIC BLOOD PRESSURE: 121 MMHG

## 2024-12-20 DIAGNOSIS — K61.0 PERIANAL ABSCESS: Primary | ICD-10-CM

## 2024-12-20 PROCEDURE — 4010F ACE/ARB THERAPY RXD/TAKEN: CPT | Performed by: NURSE PRACTITIONER

## 2024-12-20 PROCEDURE — 3074F SYST BP LT 130 MM HG: CPT | Performed by: NURSE PRACTITIONER

## 2024-12-20 PROCEDURE — 3048F LDL-C <100 MG/DL: CPT | Performed by: NURSE PRACTITIONER

## 2024-12-20 PROCEDURE — 99213 OFFICE O/P EST LOW 20 MIN: CPT | Mod: 25 | Performed by: NURSE PRACTITIONER

## 2024-12-20 PROCEDURE — 46600 DIAGNOSTIC ANOSCOPY SPX: CPT | Performed by: NURSE PRACTITIONER

## 2024-12-20 PROCEDURE — 3044F HG A1C LEVEL LT 7.0%: CPT | Performed by: NURSE PRACTITIONER

## 2024-12-20 PROCEDURE — 99203 OFFICE O/P NEW LOW 30 MIN: CPT | Performed by: NURSE PRACTITIONER

## 2024-12-20 PROCEDURE — 3079F DIAST BP 80-89 MM HG: CPT | Performed by: NURSE PRACTITIONER

## 2024-12-20 RX ORDER — AMOXICILLIN AND CLAVULANATE POTASSIUM 875; 125 MG/1; MG/1
1 TABLET, FILM COATED ORAL 2 TIMES DAILY
Qty: 14 TABLET | Refills: 0 | Status: SHIPPED | OUTPATIENT
Start: 2024-12-20 | End: 2024-12-27

## 2024-12-20 NOTE — PROGRESS NOTES
History Of Present Illness  Elie Mendnehall is a 44 y.o. male presenting with rectal bleeding.     For the past 2 months he has been having a rectal bleeding.  He is having it with or without a BM.  He is having a little soreness with the BM only.  He will have a soft Bm every other day with no straining.  He does not sit long on the toilet to have a Bm.  He does not take any fiber supplements or Colace.  He will have a little leakage of stool and mucus over the past 2 months.  No c/o any accidents of stool.  He will have a little itching to the anus at times.    No colonoscopy or any perianal surgeries.      Past Medical History  He has a past medical history of Acute sinusitis, unspecified (01/30/2020), Chronic sinusitis, unspecified (04/18/2016), Cutaneous abscess of perineum (05/01/2020), Decreased libido (05/04/2020), Exposure to laser radiation, initial encounter (01/30/2020), Other conditions influencing health status (05/06/2022), Other specified abnormal findings of blood chemistry (05/04/2020), Other symptoms and signs involving the nervous system (05/05/2020), Personal history of other diseases of the nervous system and sense organs (05/04/2020), Personal history of other diseases of the respiratory system (06/15/2016), Personal history of other diseases of the respiratory system (06/15/2016), Personal history of other diseases of the respiratory system (01/30/2020), Personal history of other diseases of the respiratory system (03/25/2016), Personal history of other diseases of the respiratory system (04/18/2016), Personal history of other infectious and parasitic diseases (01/30/2020), Snoring (05/04/2020), and Unspecified acute lower respiratory infection (05/17/2022).    Surgical History  He has a past surgical history that includes Other surgical history (04/04/2016); Other surgical history (01/30/2020); and Other surgical history (01/30/2020).     Social History  He reports that he has never smoked. He  has never used smokeless tobacco. He reports that he does not currently use alcohol. He reports that he does not use drugs.    Family History  Family History   Problem Relation Name Age of Onset    Ovarian cancer Mother      Arthritis Father      Depression Father      Other (stroke syndrome) Other          Allergies  Egg and Cefaclor    Review of Systems   All other systems reviewed and are negative.       Physical Exam  Exam conducted with a chaperone present.   Constitutional:       Appearance: Normal appearance.   HENT:      Head: Normocephalic and atraumatic.   Pulmonary:      Effort: Pulmonary effort is normal.   Musculoskeletal:         General: Normal range of motion.   Skin:     General: Skin is warm and dry.   Neurological:      General: No focal deficit present.      Mental Status: He is alert and oriented to person, place, and time.   Psychiatric:         Mood and Affect: Mood normal.         Behavior: Behavior normal.         Anoscopy    Date/Time: 12/20/2024 2:02 PM    Performed by: KELI Lewis  Authorized by: KELI Lewis    Consent:     Consent obtained:  Verbal    Consent given by:  Patient    Risks, benefits, and alternatives were discussed: yes    Universal protocol:     Procedure explained and questions answered to patient or proxy's satisfaction: yes      Patient identity confirmed:  Verbally with patient  Post-procedure details:     Procedure completion:  Tolerated  Comments:      He has a small perianal abscess in the posterior midline.  You can feel a superficial tract towards the anus.  Good tone on RENAE with pain in the posterior midline.  ON anoscopy, looking in 360 degrees, I was unable to see an internal opening    Last Recorded Vitals  /81   Pulse 108   Wt 137 kg (301 lb)      Assessment/Plan   Elie has a perianal abscess possible fistula in the posterior midline.  He will start taking Augmentin BID for a week.  He will do sitz baths prn.  We talked  about the abscess being a fistula and he understands.  He will call with any issues and will follow up in 2-3 weeks with Dr. Valencai.       Emy Madden, MICHELLE-CNP

## 2024-12-30 ENCOUNTER — APPOINTMENT (OUTPATIENT)
Dept: SURGERY | Facility: CLINIC | Age: 44
End: 2024-12-30
Payer: COMMERCIAL

## 2025-01-13 ENCOUNTER — APPOINTMENT (OUTPATIENT)
Dept: PRIMARY CARE | Facility: CLINIC | Age: 45
End: 2025-01-13
Payer: COMMERCIAL

## 2025-01-20 NOTE — PROGRESS NOTES
No chief complaint on file.      History Of Present Illness  Elie Mendenhall is a 44 y.o. male presenting with a perianal fistula.    Subjective   Elie Mendenhall was referred by Emy Madden CNP  for evaluation of rectal bleeding. Symptoms began 10/2024. 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    Pain: Yes - with wiping  Protruding Tissue: No  Bleeding: Yes - since October      Moves his bowels every other day.   No straining.   No urgency or incontinence.     Colonoscopy:   Non-smoker/No ETOH/No Illicit drug use  PMH: DM, HTN, GERD  PSH:  No family history of CRC or IBD  Employment:       Past Medical History  Past Medical History:   Diagnosis Date    Acute sinusitis, unspecified 01/30/2020    Acute rhinosinusitis    Chronic sinusitis, unspecified 04/18/2016    Sinobronchitis    Cutaneous abscess of perineum 05/01/2020    Perineal abscess, superficial    Decreased libido 05/04/2020    Decreased libido without sexual dysfunction    Exposure to laser radiation, initial encounter 01/30/2020    Injury due to laser    Other conditions influencing health status 05/06/2022    History of cough    Other specified abnormal findings of blood chemistry 05/04/2020    Low testosterone in male    Other symptoms and signs involving the nervous system 05/05/2020    Suspected sleep apnea    Personal history of other diseases of the nervous system and sense organs 05/04/2020    History of neuropathy    Personal history of other diseases of the respiratory system 06/15/2016    History of pharyngitis    Personal history of other diseases of the respiratory system 06/15/2016    History of strep pharyngitis    Personal history of other diseases of the respiratory system 01/30/2020    History of nasal obstruction    Personal history of other diseases of the respiratory system 03/25/2016    History of acute pharyngitis    Personal history of other diseases of the respiratory system 04/18/2016     History of sinusitis    Personal history of other infectious and parasitic diseases 01/30/2020    History of candidiasis of mouth    Snoring 05/04/2020    Habitual snoring    Unspecified acute lower respiratory infection 05/17/2022    Acute lower respiratory tract infection       Surgical History  Past Surgical History:   Procedure Laterality Date    OTHER SURGICAL HISTORY  04/04/2016    Ant Spinal Diskect Osteophytect Lumb Interspace Microdiscect    OTHER SURGICAL HISTORY  01/30/2020    Corneal lasik    OTHER SURGICAL HISTORY  01/30/2020    Tonsillectomy        Social History  He reports that he has never smoked. He has never used smokeless tobacco. He reports that he does not currently use alcohol. He reports that he does not use drugs.    Family History  Family History   Problem Relation Name Age of Onset    Ovarian cancer Mother      Arthritis Father      Depression Father      Other (stroke syndrome) Other          Allergies  Egg and Cefaclor    Home Medications  Prior to Admission medications    Medication Sig Start Date End Date Taking? Authorizing Provider   ARIPiprazole (Abilify) 5 mg tablet Take 1 tablet (5 mg) by mouth once daily.    Historical Provider, MD   DULoxetine (Cymbalta) 30 mg DR capsule Take 1 capsule (30 mg) by mouth once daily. Do not crush or chew.    Historical Provider, MD   DULoxetine (Cymbalta) 60 mg DR capsule Take 1 capsule (60 mg) by mouth once daily. Do not crush or chew.    Historical Provider, MD   empagliflozin (Jardiance) 10 mg Take 1 tablet (10 mg) by mouth once daily. 10/12/24 10/12/25  Sloane Reagan MD   esomeprazole (NexIUM) 40 mg DR capsule Take 1 capsule (40 mg) by mouth once daily in the morning. Take before meals. 9/9/24   Keyanna Gallagher, APRN-CNS   FreeStyle Lancets 28 gauge 1 each if needed. 2/3/23   Historical Provider, MD   FreeStyle Lite Strips strip TEST THREE TIMES A DAY 3/29/22   Historical Provider, MD   insulin degludec (Tresiba FlexTouch U-100) 100  "unit/mL (3 mL) injection Inject 80 Units under the skin once daily. Take as directed per insulin instructions. 2/8/24 2/7/25  Sloane Reagan MD   levocetirizine (Xyzal) 2.5 mg/5 mL solution Take 5 mL (2.5 mg) by mouth once daily in the evening.    Historical Provider, MD   lisinopril 40 mg tablet Take 1 tablet (40 mg) by mouth once daily. 7/12/24   MICHELLE Virgen-CNS   metFORMIN  mg 24 hr tablet Take 1 tablet (500 mg) by mouth 2 times a day with meals. 2/8/24 2/7/25  Sloane Reagan MD   montelukast (Singulair) 10 mg tablet Take 1 tablet (10 mg) by mouth once daily. 7/12/24   MICHELLE Virgen-CNS   pen needle, diabetic (BD Taylor 2nd Gen Pen Needle) 32 gauge x 5/32\" needle Use once daily with insulin 2/8/24   Sloane Reagan MD   rosuvastatin (Crestor) 5 mg tablet Take 1 tablet (5 mg) by mouth once daily. 10/12/24 10/12/25  Sloane Reagan MD   tirzepatide (Mounjaro) 12.5 mg/0.5 mL pen injector Inject 12.5 mg under the skin every 7 days.  Patient not taking: Reported on 12/20/2024 9/25/24   Sloane Reagan MD   tirzepatide (Mounjaro) 15 mg/0.5 mL pen injector Inject 15 mg under the skin every 7 days. 10/12/24   Sloane Reagan MD       Review of Systems     Physical Exam    Perineal/Rectal Exam  Perineum: posterior midline fistula opening at intersphincteric groove  RENAE: normal  Tone: normal    Anoscopy    Date/Time: 1/23/2025 1:32 PM    Performed by: Nati Burks MD  Authorized by: Nati Burks MD    Consent:     Consent obtained:  Verbal    Consent given by:  Patient    Risks, benefits, and alternatives were discussed: yes      Risks discussed:  Bleeding and pain    Alternatives discussed:  No treatment  Universal protocol:     Procedure explained and questions answered to patient or proxy's satisfaction: yes      Patient identity confirmed:  Verbally with patient  Indications:     Indications: rectal bleeding    Post-procedure details:     Procedure " completion:  Tolerated well, no immediate complications    Findings: posterior midline healed fissure with fistula opening     Last Recorded Vitals  There were no vitals taken for this visit.    Relevant Results          Assessment/Plan     Fistula associated with fissure. Plan for EUA, likely fistulotomy.  The anatomy, pathophysiology and natural history of abscesses and fistulas were discussed with the patient. Diagrams were drawn. We reviewed treatment approaches, procedures, options, preparation, perioperative course, recovery, healing, risks including but not limited to bleeding, recurrence and incontinence, and possible outcomes in detail.     The final decision as to which procedure will be performed will depend on the operative findings including complexity, level through the sphincter, etc - primary fistulotomy, staged fistulotomy with a seton,  advancement flap repair, LIFT, etc. All questions were answered.

## 2025-01-21 ENCOUNTER — APPOINTMENT (OUTPATIENT)
Dept: SURGERY | Facility: CLINIC | Age: 45
End: 2025-01-21
Payer: COMMERCIAL

## 2025-01-21 DIAGNOSIS — E11.65 TYPE 2 DIABETES MELLITUS WITH HYPERGLYCEMIA, WITHOUT LONG-TERM CURRENT USE OF INSULIN: ICD-10-CM

## 2025-01-21 RX ORDER — INSULIN DEGLUDEC 100 U/ML
INJECTION, SOLUTION SUBCUTANEOUS
Qty: 60 ML | Refills: 2 | Status: SHIPPED | OUTPATIENT
Start: 2025-01-21

## 2025-01-23 ENCOUNTER — APPOINTMENT (OUTPATIENT)
Dept: SURGERY | Facility: CLINIC | Age: 45
End: 2025-01-23
Payer: COMMERCIAL

## 2025-01-23 VITALS
BODY MASS INDEX: 42.32 KG/M2 | RESPIRATION RATE: 16 BRPM | HEIGHT: 70 IN | SYSTOLIC BLOOD PRESSURE: 108 MMHG | HEART RATE: 111 BPM | WEIGHT: 295.6 LBS | DIASTOLIC BLOOD PRESSURE: 74 MMHG

## 2025-01-23 DIAGNOSIS — K60.30 ANAL FISTULA: Primary | ICD-10-CM

## 2025-01-23 ASSESSMENT — PAIN SCALES - GENERAL: PAINLEVEL_OUTOF10: 0-NO PAIN

## 2025-01-23 ASSESSMENT — ENCOUNTER SYMPTOMS: RECTAL BLEEDING: 1

## 2025-01-27 DIAGNOSIS — E11.65 TYPE 2 DIABETES MELLITUS WITH HYPERGLYCEMIA, WITHOUT LONG-TERM CURRENT USE OF INSULIN: ICD-10-CM

## 2025-01-27 RX ORDER — PEN NEEDLE, DIABETIC 30 GX3/16"
NEEDLE, DISPOSABLE MISCELLANEOUS
Qty: 90 EACH | Refills: 3 | Status: SHIPPED | OUTPATIENT
Start: 2025-01-27

## 2025-01-27 RX ORDER — METFORMIN HYDROCHLORIDE 500 MG/1
500 TABLET, EXTENDED RELEASE ORAL
Qty: 180 TABLET | Refills: 3 | Status: SHIPPED | OUTPATIENT
Start: 2025-01-27

## 2025-02-03 ASSESSMENT — ENCOUNTER SYMPTOMS: ANAL BLEEDING: 1

## 2025-02-03 NOTE — CPM/PAT H&P
CPM/PAT Evaluation       Name: Elie Mendenhall   /Age: 1980       TELEMEDICINE ENCOUNTER  Patient was interviewed by telephone for preadmission testing perioperative risk assessment prior to surgery.    DATE OF CONSULT: 2025  REFERRING PROVIDER: Dr. Nati Valencia  SURGERY, DATE, AND LENGTH: Anus fistula closure; 2025; 75 minutes    CHIEF COMPLAINT  Anal fistula    HPI  Elie Mendenhall is a 44-year-old male with symptoms of rectal pain, and bleeding since 2024.  He was examined by colorectal physician and found to have posterior midline perianal fistula.  Patient states he moves his bowels every other day, and denies straining, or stool leakage.  He has been referred to preadmission testing in anticipation of an anal fistula repair.  Patient with medical history significant for type 2 diabetes on insulin and oral meds; AQUILINO compliant with CPAP; hypertension; hyperlipidemia; acid reflux.  Patient has no other medical complaints at this time, and reports to be in usual state of health without any recent illnesses, hospitalizations, and no current or remote infections/fevers.      ACTIVE PROBLEMS  Patient Active Problem List   Diagnosis    Benign essential hypertension    Breast mass in male    Decreased libido without sexual dysfunction    Neuropathy    Nasal obstruction    Low testosterone in male    GERD (gastroesophageal reflux disease)    Eosinophilic esophagitis    Environmental and seasonal allergies    Obstructive sleep apnea of adult    Recurrent major depressive disorder, in partial remission (CMS-[facility])    Type 2 diabetes mellitus with hyperglycemia, without long-term current use of insulin    Vitamin D deficiency    Dizziness    Healthcare maintenance    Anal fistula        PAST MEDICAL HISTORY  Past Medical History:   Diagnosis Date    Acute sinusitis, unspecified 2020    Acute rhinosinusitis    Chronic sinusitis, unspecified 2016    Sinobronchitis    Cutaneous abscess of  perineum 05/01/2020    Perineal abscess, superficial    Decreased libido 05/04/2020    Decreased libido without sexual dysfunction    Exposure to laser radiation, initial encounter 01/30/2020    Injury due to laser    Other conditions influencing health status 05/06/2022    History of cough    Other specified abnormal findings of blood chemistry 05/04/2020    Low testosterone in male    Other symptoms and signs involving the nervous system 05/05/2020    Suspected sleep apnea    Personal history of other diseases of the nervous system and sense organs 05/04/2020    History of neuropathy    Personal history of other diseases of the respiratory system 06/15/2016    History of pharyngitis    Personal history of other diseases of the respiratory system 06/15/2016    History of strep pharyngitis    Personal history of other diseases of the respiratory system 01/30/2020    History of nasal obstruction    Personal history of other diseases of the respiratory system 03/25/2016    History of acute pharyngitis    Personal history of other diseases of the respiratory system 04/18/2016    History of sinusitis    Personal history of other infectious and parasitic diseases 01/30/2020    History of candidiasis of mouth    Snoring 05/04/2020    Habitual snoring    Unspecified acute lower respiratory infection 05/17/2022    Acute lower respiratory tract infection        SURGICAL HISTORY  Past Surgical History:   Procedure Laterality Date    OTHER SURGICAL HISTORY  04/04/2016    Ant Spinal Diskect Osteophytect Lumb Interspace Microdiscect    OTHER SURGICAL HISTORY  01/30/2020    Corneal lasik    OTHER SURGICAL HISTORY  01/30/2020    Tonsillectomy    UPPER GASTROINTESTINAL ENDOSCOPY          ANESTHESIA HISTORY  Denies problems with anesthesia in the past such as PONV, prolonged sedation, awareness, dental damage, aspiration, cardiac arrest, difficult intubation, or unexpected hospital admissions. Denies family history of malignant  hyperthermia, or pseudocholinesterase deficiency.     SOCIAL HISTORY  Never smoker; rare alcohol use; no recreational drug use.  Patient states he does not engage in regular exercise nor does he have a physical job.  He states he is able to do moderate ADLs such as housework.  He does have someone do yard work.  He will have some shortness of breath when walking up a flight of stairs or inclines.  He denies chest pain.  METS 3.5    FAMILY HISTORY  Family History   Problem Relation Name Age of Onset    Ovarian cancer Mother      Arthritis Father      Depression Father      Other (stroke syndrome) Other          ALLERGIES  Allergies   Allergen Reactions    Egg Other    Cefaclor Hives        MEDICATIONS  No current facility-administered medications for this encounter.    Current Outpatient Medications:     DULoxetine (Cymbalta) 60 mg DR capsule, Take 1 capsule (60 mg) by mouth once daily. Do not crush or chew., Disp: , Rfl:     insulin degludec (Tresiba FlexTouch U-100) 100 unit/mL (3 mL) injection, INJECT 80 UNITS UNDER THE SKIN ONCE DAILY AS DIRECTED PER INSULIN INSTRUCTIONS, Disp: 60 mL, Rfl: 2    metFORMIN  mg 24 hr tablet, TAKE 1 TABLET TWICE A DAY WITH MEALS, Disp: 180 tablet, Rfl: 3    ARIPiprazole (Abilify) 5 mg tablet, Take 1 tablet (5 mg) by mouth once daily., Disp: , Rfl:     DULoxetine (Cymbalta) 30 mg DR capsule, Take 1 capsule (30 mg) by mouth once daily. Do not crush or chew., Disp: , Rfl:     empagliflozin (Jardiance) 10 mg, Take 1 tablet (10 mg) by mouth once daily., Disp: 90 tablet, Rfl: 3    esomeprazole (NexIUM) 40 mg DR capsule, Take 1 capsule (40 mg) by mouth once daily in the morning. Take before meals., Disp: 90 capsule, Rfl: 3    FreeStyle Lancets 28 gauge, 1 each if needed., Disp: , Rfl:     FreeStyle Lite Strips strip, TEST THREE TIMES A DAY, Disp: , Rfl:     levocetirizine (Xyzal) 2.5 mg/5 mL solution, Take 5 mL (2.5 mg) by mouth once daily in the evening., Disp: , Rfl:     lisinopril  "40 mg tablet, Take 1 tablet (40 mg) by mouth once daily., Disp: 90 tablet, Rfl: 3    montelukast (Singulair) 10 mg tablet, Take 1 tablet (10 mg) by mouth once daily., Disp: 90 tablet, Rfl: 3    pen needle, diabetic (BD Taylor 2nd Gen Pen Needle) 32 gauge x 5/32\" needle, USE ONCE DAILY WITH INSULIN, Disp: 90 each, Rfl: 3    rosuvastatin (Crestor) 5 mg tablet, Take 1 tablet (5 mg) by mouth once daily., Disp: 90 tablet, Rfl: 3    tirzepatide (Mounjaro) 12.5 mg/0.5 mL pen injector, Inject 12.5 mg under the skin every 7 days. (Patient not taking: Reported on 1/23/2025), Disp: 2 mL, Rfl: 2    tirzepatide (Mounjaro) 15 mg/0.5 mL pen injector, Inject 15 mg under the skin every 7 days., Disp: 6 mL, Rfl: 3     REVIEW OF SYSTEMS  Review of Systems   Gastrointestinal:  Positive for anal bleeding (Anal pain).   All other systems reviewed and are negative.    STOP BANG:  Positive for AQUILINO compliant with CPAP    PHYSICAL EXAM  Deferred    AIRWAY EXAM  Deferred    VITALS  No vitals taken for telemedicine visit  BMI Readings from Last 1 Encounters:   01/23/25 42.41 kg/m²      BP Readings from Last 4 Encounters:   01/23/25 108/74   12/20/24 121/81   08/02/24 112/74   07/12/24 116/72        LABS  Lab Results   Component Value Date    WBC 10.1 01/05/2024    HGB 15.4 01/05/2024    HCT 47.1 01/05/2024    MCV 83 01/05/2024     01/05/2024      Lab Results   Component Value Date    GLUCOSE 94 07/31/2024    CALCIUM 9.4 07/31/2024     07/31/2024    K 4.1 07/31/2024    CO2 31 07/31/2024     07/31/2024    BUN 19 07/31/2024    CREATININE 1.04 07/31/2024      Lab Results   Component Value Date    HGBA1C 6.4 (H) 07/31/2024      Lab Results   Component Value Date    CHOL 135 01/05/2024    CHOL 187 06/30/2022    CHOL 177 10/19/2021     Lab Results   Component Value Date    HDL 38.2 01/05/2024    HDL 45.7 06/30/2022    HDL 34.5 (A) 10/19/2021     Lab Results   Component Value Date    LDLCALC 72 01/05/2024     Lab Results   Component " Value Date    TRIG 125 01/05/2024    TRIG 139 06/30/2022    TRIG 143 10/19/2021       ASSESSMENT/PLAN  Anal fistula  Anal fistula repair      Preoperative instructions reviewed in detail with patient during this encounter. A copy of these instructions has been unloaded to [facility] along with a copy sent to either home email address or mailed to home address.  This note was created in part upon personal review of patient's medical records.  Speech recognition transcription software was used in the creation of this note. Despite proofreading, several typographical errors might be present that might affect the meaning of the content.

## 2025-02-03 NOTE — PREPROCEDURE INSTRUCTIONS
Pre-Op Instructions &?Checklist       Your surgery has been scheduled at [facility] the [facility] at [address] [place], [address], [address], in the [facility]. Parking is to the left of the main entrance.      You will be contacted about the time of your surgery the day before your surgery (if your surgery is on a Monday, you will be called the Friday before surgery). If you are unable to answer the phone, a detailed voicemail message will be left. Make sure that your voicemail box is not full so a message can be left. If you have not received a call by 3:00 pm you may call [phone] between the hours of 3:00 and 4:00 pm. Please be available by phone the night before/day of surgery in case there is a change in the schedule which may require you to arrive earlier/later.      ?      14 DAYS BEFORE SURGERY STOP TAKING WEIGHT LOSS MEDICATIONS       ?7 DAYS BEFORE SURGERY STOP THESE MEDICATIONS:       * Multiple Vitamins containing Vitamin E       * Herbal supplements, Fish Oil, garlic pills, turmeric, CoQ enzyme       *Stop taking aspirin, aspirin-containing products, and NSAID's like Advil, Motrin, Aleve, Ibuprofen, Meloxicam, Celecoxib, and Diclofenac. Tylenol is okay to take for pain relief.        *If you are currently taking Coumadin/Warfarin, we will have to coordinate that with your PCP &/or the Anticoagulation Clinic.     Patients taking Mounjaro are asked to stop this medication 7 days before surgery. Your last injection was on Wednesday, 01/29/2025. This medication may be resumed the week after surgery when your next dose is scheduled.      3 DAYS BEFORE SURGERY  Stop taking Jardiance. This medication may be resumed the day following surgery.    THE DAY BEFORE SURGERY:       *Do not eat any food after midnight the night before surgery.        *You are permitted to have no more than 4 ounces of clear liquids such as water, apple juice, plain tea or coffee (no milk or creamer), clear  electrolyte-replenishing drinks such as Pedialyte, Gatorade, or         Powerade  (not yogurt or pulp-containing smoothies or juices such as orange juice) up to 3 hours before your arrival time.      THE EVENING BEFORE SURGERY  Inject your FULL dose of long-acting insulin as usual    DAY OF SURGERY TAKE THESE MEDICATIONS (if it is not listed, do not take it.)    There are no medications for you to take on the morning of surgery.     ON THE MORNING OF SURGERY:       *Shower either the night before your surgery or the morning of your surgery       *Do not use moisturizers, creams, lotions or perfume, or make-up.       *Wear comfortable, loose fitting clothing.        *All jewelry and valuables should be left at home.       *Prosthetic devices such as contact lenses, hearing aids, dentures, eyelash extensions, hairpins and body piercings must be removed before surgery. Bring containers for eyeglasses/contacts, dentures, or         hearing aids with you.      ? Diabetics: Please check fasting blood sugars upon waking up. ?If fasting blood sugars are <80ml/dl, please drink 100ml/3oz. of apple juice no later than 2 hours prior to surgery.      ?BRING WITH YOU:        *Photo ID and insurance card       *Current list of medicines and allergies       *Pacemaker/Defibrillator/Heart stent cards       *Copy of your complete Advanced Directive/DHPOA-if applicable      ?SMOKING:       *Quitting smoking can make a huge difference to your health and recovery from surgery. ?       *If you need help with quitting, call [phone].        ALCOHOL:       *No alcoholic beverages for 48 hours before surgery.      ?AFTER OUTPATIENT SURGERY:       *A responsible adult MUST accompany you at the time of discharge and stay with you for 24 hours after your surgery.       *You may NOT drive yourself home after surgery.       *You may use a taxi or ride sharing service ([employer], [employer]) to return home ONLY if you are accompanied by a friend or  family member       *Instructions for resuming your medications will be provided by your surgeon.      CONTACT SURGEON'S OFFICE IF YOU DEVELOP:       *Fever =/>?100.4 F        *New respiratory symptoms (e.g. cough, shortness of breath, respiratory distress, sore throat)       *Recent loss of taste or smell       *Flu like symptoms such as headache, fatigue or gastrointestinal symptoms       *If you develop any open sores, shingles, burning or painful urination    AND/OR:       *You no longer wish to have the surgery.       *Any other personal circumstances change that may lead to the need to cancel or defer this surgery.       *You were admitted to any hospital within one week of your planned procedure.      ?If you have any questions regarding these preoperative instructions, you may call [phone]. If you have questions regarding you surgical procedure, or post-operative care/recovery please call your surgeon's office.      Link to [facility] Laboratory Services Locations   [email or web address]      Link to [facility]   [email or web address]\

## 2025-02-04 ENCOUNTER — ANESTHESIA EVENT (OUTPATIENT)
Dept: OPERATING ROOM | Facility: CLINIC | Age: 45
End: 2025-02-04
Payer: COMMERCIAL

## 2025-02-07 ENCOUNTER — APPOINTMENT (OUTPATIENT)
Dept: ENDOCRINOLOGY | Facility: CLINIC | Age: 45
End: 2025-02-07
Payer: COMMERCIAL

## 2025-02-07 ENCOUNTER — ANESTHESIA (OUTPATIENT)
Dept: OPERATING ROOM | Facility: CLINIC | Age: 45
End: 2025-02-07
Payer: COMMERCIAL

## 2025-02-07 ENCOUNTER — HOSPITAL ENCOUNTER (OUTPATIENT)
Facility: CLINIC | Age: 45
Setting detail: OUTPATIENT SURGERY
Discharge: HOME | End: 2025-02-07
Attending: SURGERY | Admitting: SURGERY
Payer: COMMERCIAL

## 2025-02-07 VITALS
RESPIRATION RATE: 16 BRPM | HEIGHT: 70 IN | WEIGHT: 295.2 LBS | DIASTOLIC BLOOD PRESSURE: 56 MMHG | SYSTOLIC BLOOD PRESSURE: 104 MMHG | TEMPERATURE: 97.5 F | HEART RATE: 103 BPM | BODY MASS INDEX: 42.26 KG/M2 | OXYGEN SATURATION: 97 %

## 2025-02-07 DIAGNOSIS — K60.30 ANAL FISTULA: Primary | ICD-10-CM

## 2025-02-07 LAB — GLUCOSE BLD MANUAL STRIP-MCNC: 106 MG/DL (ref 74–99)

## 2025-02-07 PROCEDURE — 82947 ASSAY GLUCOSE BLOOD QUANT: CPT

## 2025-02-07 PROCEDURE — 2500000005 HC RX 250 GENERAL PHARMACY W/O HCPCS: Performed by: SURGERY

## 2025-02-07 PROCEDURE — 2500000004 HC RX 250 GENERAL PHARMACY W/ HCPCS (ALT 636 FOR OP/ED): Performed by: ANESTHESIOLOGIST ASSISTANT

## 2025-02-07 PROCEDURE — 7100000009 HC PHASE TWO TIME - INITIAL BASE CHARGE: Performed by: SURGERY

## 2025-02-07 PROCEDURE — 3600000003 HC OR TIME - INITIAL BASE CHARGE - PROCEDURE LEVEL THREE: Performed by: SURGERY

## 2025-02-07 PROCEDURE — 7100000002 HC RECOVERY ROOM TIME - EACH INCREMENTAL 1 MINUTE: Performed by: SURGERY

## 2025-02-07 PROCEDURE — 2720000007 HC OR 272 NO HCPCS: Performed by: SURGERY

## 2025-02-07 PROCEDURE — 7100000010 HC PHASE TWO TIME - EACH INCREMENTAL 1 MINUTE: Performed by: SURGERY

## 2025-02-07 PROCEDURE — 3700000001 HC GENERAL ANESTHESIA TIME - INITIAL BASE CHARGE: Performed by: SURGERY

## 2025-02-07 PROCEDURE — 3600000008 HC OR TIME - EACH INCREMENTAL 1 MINUTE - PROCEDURE LEVEL THREE: Performed by: SURGERY

## 2025-02-07 PROCEDURE — 7100000001 HC RECOVERY ROOM TIME - INITIAL BASE CHARGE: Performed by: SURGERY

## 2025-02-07 PROCEDURE — 2500000004 HC RX 250 GENERAL PHARMACY W/ HCPCS (ALT 636 FOR OP/ED): Performed by: SURGERY

## 2025-02-07 PROCEDURE — 46275 REMOVE ANAL FIST INTER: CPT | Performed by: SURGERY

## 2025-02-07 PROCEDURE — 3700000002 HC GENERAL ANESTHESIA TIME - EACH INCREMENTAL 1 MINUTE: Performed by: SURGERY

## 2025-02-07 RX ORDER — SODIUM CHLORIDE 0.9 G/100ML
INJECTION, SOLUTION IRRIGATION AS NEEDED
Status: DISCONTINUED | OUTPATIENT
Start: 2025-02-07 | End: 2025-02-07 | Stop reason: HOSPADM

## 2025-02-07 RX ORDER — ONDANSETRON HYDROCHLORIDE 2 MG/ML
INJECTION, SOLUTION INTRAVENOUS AS NEEDED
Status: DISCONTINUED | OUTPATIENT
Start: 2025-02-07 | End: 2025-02-07

## 2025-02-07 RX ORDER — ONDANSETRON HYDROCHLORIDE 2 MG/ML
4 INJECTION, SOLUTION INTRAVENOUS ONCE AS NEEDED
Status: DISCONTINUED | OUTPATIENT
Start: 2025-02-07 | End: 2025-02-07 | Stop reason: HOSPADM

## 2025-02-07 RX ORDER — FENTANYL CITRATE 50 UG/ML
INJECTION, SOLUTION INTRAMUSCULAR; INTRAVENOUS AS NEEDED
Status: DISCONTINUED | OUTPATIENT
Start: 2025-02-07 | End: 2025-02-07

## 2025-02-07 RX ORDER — POVIDONE-IODINE 10 %
SOLUTION, NON-ORAL TOPICAL AS NEEDED
Status: DISCONTINUED | OUTPATIENT
Start: 2025-02-07 | End: 2025-02-07 | Stop reason: HOSPADM

## 2025-02-07 RX ORDER — PROPOFOL 10 MG/ML
INJECTION, EMULSION INTRAVENOUS AS NEEDED
Status: DISCONTINUED | OUTPATIENT
Start: 2025-02-07 | End: 2025-02-07

## 2025-02-07 RX ORDER — BUPIVACAINE HCL/EPINEPHRINE 0.25-.0005
VIAL (ML) INJECTION AS NEEDED
Status: DISCONTINUED | OUTPATIENT
Start: 2025-02-07 | End: 2025-02-07 | Stop reason: HOSPADM

## 2025-02-07 RX ORDER — FENTANYL CITRATE 50 UG/ML
50 INJECTION, SOLUTION INTRAMUSCULAR; INTRAVENOUS EVERY 5 MIN PRN
Status: DISCONTINUED | OUTPATIENT
Start: 2025-02-07 | End: 2025-02-07 | Stop reason: HOSPADM

## 2025-02-07 RX ORDER — OXYCODONE HYDROCHLORIDE 5 MG/1
5 TABLET ORAL EVERY 6 HOURS PRN
Qty: 20 TABLET | Refills: 0 | Status: SHIPPED | OUTPATIENT
Start: 2025-02-07

## 2025-02-07 RX ORDER — FENTANYL CITRATE 50 UG/ML
25 INJECTION, SOLUTION INTRAMUSCULAR; INTRAVENOUS EVERY 5 MIN PRN
Status: DISCONTINUED | OUTPATIENT
Start: 2025-02-07 | End: 2025-02-07 | Stop reason: HOSPADM

## 2025-02-07 RX ORDER — LIDOCAINE HYDROCHLORIDE 20 MG/ML
INJECTION, SOLUTION INFILTRATION; PERINEURAL AS NEEDED
Status: DISCONTINUED | OUTPATIENT
Start: 2025-02-07 | End: 2025-02-07

## 2025-02-07 RX ORDER — LABETALOL HYDROCHLORIDE 5 MG/ML
5 INJECTION, SOLUTION INTRAVENOUS ONCE AS NEEDED
Status: DISCONTINUED | OUTPATIENT
Start: 2025-02-07 | End: 2025-02-07 | Stop reason: HOSPADM

## 2025-02-07 RX ORDER — MECLIZINE HYDROCHLORIDE 25 MG/1
25 TABLET ORAL 3 TIMES DAILY PRN
COMMUNITY

## 2025-02-07 RX ORDER — SODIUM CHLORIDE, SODIUM LACTATE, POTASSIUM CHLORIDE, CALCIUM CHLORIDE 600; 310; 30; 20 MG/100ML; MG/100ML; MG/100ML; MG/100ML
50 INJECTION, SOLUTION INTRAVENOUS CONTINUOUS
Status: SHIPPED | OUTPATIENT
Start: 2025-02-07 | End: 2025-02-07

## 2025-02-07 RX ORDER — MIDAZOLAM HYDROCHLORIDE 1 MG/ML
INJECTION, SOLUTION INTRAMUSCULAR; INTRAVENOUS AS NEEDED
Status: DISCONTINUED | OUTPATIENT
Start: 2025-02-07 | End: 2025-02-07

## 2025-02-07 RX ORDER — OXYCODONE HYDROCHLORIDE 5 MG/1
10 TABLET ORAL EVERY 4 HOURS PRN
Status: DISCONTINUED | OUTPATIENT
Start: 2025-02-07 | End: 2025-02-07 | Stop reason: HOSPADM

## 2025-02-07 RX ORDER — ALBUTEROL SULFATE 0.83 MG/ML
2.5 SOLUTION RESPIRATORY (INHALATION) ONCE AS NEEDED
Status: DISCONTINUED | OUTPATIENT
Start: 2025-02-07 | End: 2025-02-07 | Stop reason: HOSPADM

## 2025-02-07 RX ORDER — OXYCODONE HYDROCHLORIDE 5 MG/1
5 TABLET ORAL EVERY 4 HOURS PRN
Status: DISCONTINUED | OUTPATIENT
Start: 2025-02-07 | End: 2025-02-07 | Stop reason: HOSPADM

## 2025-02-07 RX ORDER — DIPHENHYDRAMINE HYDROCHLORIDE 50 MG/ML
12.5 INJECTION INTRAMUSCULAR; INTRAVENOUS ONCE AS NEEDED
Status: DISCONTINUED | OUTPATIENT
Start: 2025-02-07 | End: 2025-02-07 | Stop reason: HOSPADM

## 2025-02-07 RX ORDER — ACETAMINOPHEN 325 MG/1
650 TABLET ORAL EVERY 4 HOURS PRN
Status: DISCONTINUED | OUTPATIENT
Start: 2025-02-07 | End: 2025-02-07 | Stop reason: HOSPADM

## 2025-02-07 RX ADMIN — PROPOFOL 200 MG: 10 INJECTION, EMULSION INTRAVENOUS at 08:15

## 2025-02-07 RX ADMIN — FENTANYL CITRATE 50 MCG: 0.05 INJECTION, SOLUTION INTRAMUSCULAR; INTRAVENOUS at 08:15

## 2025-02-07 RX ADMIN — LIDOCAINE HYDROCHLORIDE 100 MG: 20 INJECTION, SOLUTION INFILTRATION; PERINEURAL at 08:15

## 2025-02-07 RX ADMIN — MIDAZOLAM 2 MG: 1 INJECTION INTRAMUSCULAR; INTRAVENOUS at 08:13

## 2025-02-07 RX ADMIN — ONDANSETRON 4 MG: 2 INJECTION INTRAMUSCULAR; INTRAVENOUS at 08:25

## 2025-02-07 RX ADMIN — FENTANYL CITRATE 50 MCG: 0.05 INJECTION, SOLUTION INTRAMUSCULAR; INTRAVENOUS at 08:21

## 2025-02-07 ASSESSMENT — PAIN - FUNCTIONAL ASSESSMENT
PAIN_FUNCTIONAL_ASSESSMENT: 0-10
PAIN_FUNCTIONAL_ASSESSMENT: 0-10
PAIN_FUNCTIONAL_ASSESSMENT: UNABLE TO SELF-REPORT

## 2025-02-07 ASSESSMENT — COLUMBIA-SUICIDE SEVERITY RATING SCALE - C-SSRS
1. IN THE PAST MONTH, HAVE YOU WISHED YOU WERE DEAD OR WISHED YOU COULD GO TO SLEEP AND NOT WAKE UP?: NO
6. HAVE YOU EVER DONE ANYTHING, STARTED TO DO ANYTHING, OR PREPARED TO DO ANYTHING TO END YOUR LIFE?: NO
2. HAVE YOU ACTUALLY HAD ANY THOUGHTS OF KILLING YOURSELF?: NO
2. HAVE YOU ACTUALLY HAD ANY THOUGHTS OF KILLING YOURSELF?: NO
1. IN THE PAST MONTH, HAVE YOU WISHED YOU WERE DEAD OR WISHED YOU COULD GO TO SLEEP AND NOT WAKE UP?: NO
6. HAVE YOU EVER DONE ANYTHING, STARTED TO DO ANYTHING, OR PREPARED TO DO ANYTHING TO END YOUR LIFE?: NO

## 2025-02-07 ASSESSMENT — PAIN SCALES - GENERAL
PAINLEVEL_OUTOF10: 0 - NO PAIN
PAINLEVEL_OUTOF10: 0 - NO PAIN

## 2025-02-07 NOTE — ANESTHESIA PREPROCEDURE EVALUATION
Patient: Elie Mendenhall    Procedure Information       Date/Time: 02/07/25 0830    Procedure: CLOSURE, FISTULA, ANUS    [place] [facility] [place] [facility] [place] [facility] [place] [facility] [place]    Surgeons: Nati Burks MD            Relevant Problems   Anesthesia (within normal limits)      Cardiac   (+) Benign essential hypertension      Pulmonary   (+) AQUILINO (obstructive sleep apnea) (Wears CPAP)      Neuro   (+) Recurrent major depressive disorder, in partial remission (CMS-HCC)      GI   (+) GERD (gastroesophageal reflux disease) (Well controlled)      /Renal (within normal limits)      Liver (within normal limits)      Endocrine   (+) Type 2 diabetes mellitus with hyperglycemia, without long-term current use of insulin      Hematology (within normal limits)      Musculoskeletal (within normal limits)      HEENT (within normal limits)      ID (within normal limits)      Skin (within normal limits)      GYN (within normal limits)       Clinical information reviewed:   Tobacco  Allergies  Meds  Problems  Med Hx  Surg Hx   Fam Hx  Soc   Hx        NPO Detail:  NPO/Void Status  Date of Last Liquid: 02/06/25  Time of Last Liquid: 1900  Date of Last Solid: 02/06/25  Time of Last Solid: 1900         Physical Exam    Airway  Mallampati: III  TM distance: >3 FB  Neck ROM: full     Cardiovascular    Dental - normal exam     Pulmonary    Abdominal        Anesthesia Plan    History of general anesthesia?: yes  History of complications of general anesthesia?: no    ASA 3     general     intravenous induction   Anesthetic plan and risks discussed with patient.    Plan discussed with CAA.

## 2025-02-07 NOTE — OP NOTE
CLOSURE, FISTULA, ANUS Operative Note     Date: 2025  [place] [facility] [place] [facility] [place]    Name: Elie Mendenhall, : 1980, Age: 44 y.o., MRN: [id number], Sex: male    Diagnosis  Pre-op Diagnosis      * Anal fistula [K60.30] Post-op Diagnosis     * Anal fistula [K60.30]     Procedures  CLOSURE, FISTULA, ANUS  [id number] - MD I AND D RECTAL ABSCESS + FISTULECTOMY      Surgeons      * Nati Burks - Primary    Resident/Fellow/Other Assistant:  Surgeons and Role:  * No surgeons found with a matching role *    Staff:   Circulator: Heather Whiteub Person: Krupa Whiteub Person: Surinder    Anesthesia Staff: Anesthesiologist: Amadeo Keith DO  C-AA: BRAN Kearney    Procedure Summary  Anesthesia: General  ASA: III  Estimated Blood Loss: 5mL  Intra-op Medications: Administrations occurring from 0830 to 0945 on 25:  * No intraprocedure medications in log *           Anesthesia Record               Intraprocedure I/O Totals       None           Specimen: No specimens collected              Drains and/or Catheters: * None in log *    Tourniquet Times:         Implants:     Findings: superficial fistula associated with fissure     Indications: Elie Mendenhall is an 44 y.o. male who is having surgery for Anal fistula [K60.30].     The patient was seen in the preoperative area. The risks, benefits, complications, treatment options, non-operative alternatives, expected recovery and outcomes were discussed with the patient. The possibilities of reaction to medication, pulmonary aspiration, injury to surrounding structures, bleeding, recurrent infection, the need for additional procedures, failure to diagnose a condition, and creating a complication requiring transfusion or operation were discussed with the patient. The patient concurred with the proposed plan, giving informed consent.  The site of surgery was properly noted/marked if necessary per policy. The patient has been actively warmed in preoperative area.  Venous thrombosis prophylaxis have been ordered including bilateral sequential compression devices    Procedure Details:   Procedure:   Informed consent was obtained including discussion of risks, benefits, and alternatives. The patient was brought to the operating room and placed supine. Sequential compression devices were placed. Huddle was completed in accordance with hospital procedures. Anesthesia was induced and LMA was placed. The patient was placed in lithotomy with all pressure points padded. The area was prepped and draped in the usual fashion. Time out was completed.     Perineal and digital rectal exam were performed. Pudendal nerve block was performed with 1/4% marcaine with epinephrine. Intersphincteric block  was also performed. A total of 30cc of local anesthesia was utilized.    There was an external opening in the intersphincteric groove. Retractor was introduced. A chronic posterior midline fissure with granulation tissue was noted. Probe easily passed from external opening into this fissure. There was minimal muscle involvement. Fistulotomy was performed and granulation tissue was fulgurated to destroy it. There were no overlapping edges.   The remainder of the anal canal was normal. Surgicel was placed for hemostasis     The patient was then returned to supine. Anesthesia was weaned and the endotracheal tube was removed. The patient was transferred to PACU awake and in stable conditions. Counts were reported correct at the end of the procedure. I was present and scrubbed throughout.    Complications:  None; patient tolerated the procedure well.    Disposition: PACU - hemodynamically stable.  Condition: stable                 Additional Details: none    Attending Attestation: I was present and scrubbed for the entire procedure.    Nati Burks  Phone Number: [phone]

## 2025-02-07 NOTE — ANESTHESIA POSTPROCEDURE EVALUATION
Patient: Elie Mendenhall    Procedure Summary       Date: 02/07/25 Room / [place]: [facility] [place] [facility] [place] 04 / [facility] [place] [facility] [place]    Anesthesia Start: 0811 Anesthesia Stop: 0837    Procedure: CLOSURE, FISTULA, ANUS Diagnosis:       Anal fistula      (Anal fistula [K60.30])    Surgeons: Nati Burks MD Responsible Provider: Amadeo Keith DO    Anesthesia Type: general ASA Status: 3            Anesthesia Type: general    Vitals Value Taken Time   /60 02/07/25 0920   Temp 36.4 °C (97.5 °F) 02/07/25 0834   Pulse 103 02/07/25 0920   Resp 16 02/07/25 0920   SpO2 95 % 02/07/25 0920       Anesthesia Post Evaluation    Patient location during evaluation: PACU  Patient participation: complete - patient participated  Level of consciousness: awake  Pain management: satisfactory to patient  Multimodal analgesia pain management approach  Airway patency: patent  Cardiovascular status: acceptable  Respiratory status: acceptable  Hydration status: acceptable  Postoperative Nausea and Vomiting: none    There were no known notable events for this encounter.

## 2025-02-10 ENCOUNTER — APPOINTMENT (OUTPATIENT)
Dept: PRIMARY CARE | Facility: CLINIC | Age: 45
End: 2025-02-10
Payer: COMMERCIAL

## 2025-03-12 NOTE — PROGRESS NOTES
No chief complaint on file.      History Of Present Illness  Elie Mendenhall is a 44 y.o. male presenting for post operative follow up of an anal fistula.     Subjective   Elie Mendenhall was referred by Emy Madden CNP  for evaluation of a posterior midline perianal fistula. He is s/p EUA and fistulotomy on 2/7/25.     He had some bleeding immediately after his surgery which resolved. Since then he had one episode of bleeding with wiping on Saturday. Pain has resolved. He moves his bowels every other day. Stools are soft. He is taking fiber.     Colonoscopy:   Non-smoker/No ETOH/No Illicit drug use  PMH: DM, HTN, GERD  PSH:  No family history of CRC or IBD  Employment:       Past Medical History  Past Medical History:   Diagnosis Date    Acute sinusitis, unspecified 01/30/2020    Acute rhinosinusitis    Chronic sinusitis, unspecified 04/18/2016    Sinobronchitis    Cutaneous abscess of perineum 05/01/2020    Perineal abscess, superficial    Decreased libido 05/04/2020    Decreased libido without sexual dysfunction    Exposure to laser radiation, initial encounter 01/30/2020    Injury due to laser    Other conditions influencing health status 05/06/2022    History of cough    Other specified abnormal findings of blood chemistry 05/04/2020    Low testosterone in male    Other symptoms and signs involving the nervous system 05/05/2020    Suspected sleep apnea    Personal history of other diseases of the nervous system and sense organs 05/04/2020    History of neuropathy    Personal history of other diseases of the respiratory system 06/15/2016    History of pharyngitis    Personal history of other diseases of the respiratory system 06/15/2016    History of strep pharyngitis    Personal history of other diseases of the respiratory system 01/30/2020    History of nasal obstruction    Personal history of other diseases of the respiratory system 03/25/2016    History of acute pharyngitis    Personal history of other  diseases of the respiratory system 04/18/2016    History of sinusitis    Personal history of other infectious and parasitic diseases 01/30/2020    History of candidiasis of mouth    Snoring 05/04/2020    Habitual snoring    Unspecified acute lower respiratory infection 05/17/2022    Acute lower respiratory tract infection       Surgical History  Past Surgical History:   Procedure Laterality Date    OTHER SURGICAL HISTORY  04/04/2016    Ant Spinal Diskect Osteophytect Lumb Interspace Microdiscect    OTHER SURGICAL HISTORY  01/30/2020    Corneal lasik    OTHER SURGICAL HISTORY  01/30/2020    Tonsillectomy    UPPER GASTROINTESTINAL ENDOSCOPY          Social History  He reports that he has never smoked. He has never used smokeless tobacco. He reports that he does not currently use alcohol. He reports that he does not use drugs.    Family History  Family History   Problem Relation Name Age of Onset    Ovarian cancer Mother      Arthritis Father      Depression Father      Other (stroke syndrome) Other          Allergies  Egg and Cefaclor    Home Medications  Prior to Admission medications    Medication Sig Start Date End Date Taking? Authorizing Provider   ARIPiprazole (Abilify) 5 mg tablet Take 1 tablet (5 mg) by mouth once daily.    Historical Provider, MD   DULoxetine (Cymbalta) 30 mg DR capsule Take 1 capsule (30 mg) by mouth once daily. Do not crush or chew.    Historical Provider, MD   DULoxetine (Cymbalta) 60 mg DR capsule Take 1 capsule (60 mg) by mouth once daily. Do not crush or chew.    Historical Provider, MD   empagliflozin (Jardiance) 10 mg Take 1 tablet (10 mg) by mouth once daily. 10/12/24 10/12/25  Sloane Reagan MD   esomeprazole (NexIUM) 40 mg DR capsule Take 1 capsule (40 mg) by mouth once daily in the morning. Take before meals. 9/9/24   Keyanna Gallagher, APRN-CNS   FreeStyle Lancets 28 gauge 1 each if needed. 2/3/23   Historical Provider, MD   FreeStyle Lite Strips strip TEST THREE TIMES A DAY  "3/29/22   Historical Provider, MD   insulin degludec (Tresiba FlexTouch U-100) 100 unit/mL (3 mL) injection Inject 80 Units under the skin once daily. Take as directed per insulin instructions. 2/8/24 2/7/25  Sloane Reagan MD   levocetirizine (Xyzal) 2.5 mg/5 mL solution Take 5 mL (2.5 mg) by mouth once daily in the evening.    Historical Provider, MD   lisinopril 40 mg tablet Take 1 tablet (40 mg) by mouth once daily. 7/12/24   MICHELLE Virgen-CNS   metFORMIN  mg 24 hr tablet Take 1 tablet (500 mg) by mouth 2 times a day with meals. 2/8/24 2/7/25  Sloane Reagan MD   montelukast (Singulair) 10 mg tablet Take 1 tablet (10 mg) by mouth once daily. 7/12/24   MICHELLE Virgen-CNS   pen needle, diabetic (BD Taylor 2nd Gen Pen Needle) 32 gauge x 5/32\" needle Use once daily with insulin 2/8/24   Sloane Reagan MD   rosuvastatin (Crestor) 5 mg tablet Take 1 tablet (5 mg) by mouth once daily. 10/12/24 10/12/25  Sloane Reagan MD   tirzepatide (Mounjaro) 12.5 mg/0.5 mL pen injector Inject 12.5 mg under the skin every 7 days.  Patient not taking: Reported on 12/20/2024 9/25/24   Sloane Reagan MD   tirzepatide (Mounjaro) 15 mg/0.5 mL pen injector Inject 15 mg under the skin every 7 days. 10/12/24   Sloane Reagan MD       Review of Systems     Physical Exam    Perineal/Rectal Exam  Perineum: posterior midline fistulotomy site healing well with some small amount of healthy granulation tissue        Last Recorded Vitals  There were no vitals taken for this visit.    Relevant Results          Assessment/Plan       Healing well. Reassured re: bleeding with harder Bms but if still occurring in 8 weeks he should come back  Colonoscopy at age 47 for screening.      "

## 2025-03-18 ENCOUNTER — OFFICE VISIT (OUTPATIENT)
Dept: SURGERY | Facility: CLINIC | Age: 45
End: 2025-03-18
Payer: COMMERCIAL

## 2025-03-18 VITALS
BODY MASS INDEX: 41.9 KG/M2 | OXYGEN SATURATION: 97 % | DIASTOLIC BLOOD PRESSURE: 75 MMHG | WEIGHT: 292 LBS | SYSTOLIC BLOOD PRESSURE: 111 MMHG | HEART RATE: 94 BPM

## 2025-03-18 DIAGNOSIS — K60.30 ANAL FISTULA: Primary | ICD-10-CM

## 2025-03-18 PROCEDURE — 99213 OFFICE O/P EST LOW 20 MIN: CPT | Performed by: SURGERY

## 2025-03-18 PROCEDURE — 3078F DIAST BP <80 MM HG: CPT | Performed by: SURGERY

## 2025-03-18 PROCEDURE — 4010F ACE/ARB THERAPY RXD/TAKEN: CPT | Performed by: SURGERY

## 2025-03-18 PROCEDURE — 3074F SYST BP LT 130 MM HG: CPT | Performed by: SURGERY

## 2025-03-18 PROCEDURE — 99024 POSTOP FOLLOW-UP VISIT: CPT | Performed by: SURGERY

## 2025-03-26 ENCOUNTER — APPOINTMENT (OUTPATIENT)
Dept: ENDOCRINOLOGY | Facility: CLINIC | Age: 45
End: 2025-03-26
Payer: COMMERCIAL

## 2025-03-26 VITALS
SYSTOLIC BLOOD PRESSURE: 124 MMHG | HEIGHT: 70 IN | WEIGHT: 293.8 LBS | DIASTOLIC BLOOD PRESSURE: 70 MMHG | HEART RATE: 80 BPM | RESPIRATION RATE: 16 BRPM | BODY MASS INDEX: 42.06 KG/M2

## 2025-03-26 DIAGNOSIS — E78.5 HYPERLIPIDEMIA, UNSPECIFIED HYPERLIPIDEMIA TYPE: ICD-10-CM

## 2025-03-26 DIAGNOSIS — I10 BENIGN ESSENTIAL HYPERTENSION: ICD-10-CM

## 2025-03-26 DIAGNOSIS — E11.65 TYPE 2 DIABETES MELLITUS WITH HYPERGLYCEMIA, WITHOUT LONG-TERM CURRENT USE OF INSULIN: Primary | ICD-10-CM

## 2025-03-26 PROCEDURE — 3074F SYST BP LT 130 MM HG: CPT | Performed by: INTERNAL MEDICINE

## 2025-03-26 PROCEDURE — 1036F TOBACCO NON-USER: CPT | Performed by: INTERNAL MEDICINE

## 2025-03-26 PROCEDURE — 3008F BODY MASS INDEX DOCD: CPT | Performed by: INTERNAL MEDICINE

## 2025-03-26 PROCEDURE — 3078F DIAST BP <80 MM HG: CPT | Performed by: INTERNAL MEDICINE

## 2025-03-26 PROCEDURE — 99214 OFFICE O/P EST MOD 30 MIN: CPT | Performed by: INTERNAL MEDICINE

## 2025-03-26 PROCEDURE — 4010F ACE/ARB THERAPY RXD/TAKEN: CPT | Performed by: INTERNAL MEDICINE

## 2025-03-26 ASSESSMENT — ENCOUNTER SYMPTOMS
VOMITING: 0
LIGHT-HEADEDNESS: 0
DIZZINESS: 0
SHORTNESS OF BREATH: 0
CHILLS: 0
FEVER: 0
NAUSEA: 0
DIARRHEA: 0

## 2025-03-26 NOTE — ASSESSMENT & PLAN NOTE
Fasting labs due today  Meds are current for refills but encouraged he message with any concerns

## 2025-03-26 NOTE — PATIENT INSTRUCTIONS
Fasting labs today  Plan based on results  Good luck with the move!  Please call or message with any concerns

## 2025-03-26 NOTE — PROGRESS NOTES
Endocrinology: Follow up visit  Subjective   Patient ID: Elie Mendenhall is a 44 y.o. male who presents for Diabetes (Type 2 ) and Hypertension.    PCP: MICHELLE Virgen-CNS    HPI  Dm2  Moujaro 15 mg  Jardiance 10 mg  Xititck57 units  Metformin 1000 every day  Since last visit doing well.   Tolerated switch to mounjar 15 mg with no issues  Reports sugars are great, no lows  Moving to [place] to work at [employer] in the next few weeks    Review of Systems   Constitutional:  Negative for chills and fever.   Respiratory:  Negative for shortness of breath.    Gastrointestinal:  Negative for diarrhea, nausea and vomiting.   Endocrine: Negative for cold intolerance and heat intolerance.   Neurological:  Negative for dizziness and light-headedness.       Patient Active Problem List   Diagnosis    Benign essential hypertension    Breast mass in male    Decreased libido without sexual dysfunction    Neuropathy    Nasal obstruction    Low testosterone in male    GERD (gastroesophageal reflux disease)    Eosinophilic esophagitis    Environmental and seasonal allergies    AQUILINO (obstructive sleep apnea)    Recurrent major depressive disorder, in partial remission (CMS-[facility])    Type 2 diabetes mellitus with hyperglycemia, without long-term current use of insulin    Vitamin D deficiency    Dizziness    Healthcare maintenance    Anal fistula        Home Meds:  Current Outpatient Medications   Medication Instructions    ARIPiprazole (ABILIFY) 5 mg, Daily    DULoxetine (CYMBALTA) 60 mg, Daily    DULoxetine (CYMBALTA) 30 mg, Daily    empagliflozin (JARDIANCE) 10 mg, oral, Daily    esomeprazole (NEXIUM) 40 mg, oral, Daily before breakfast    FreeStyle Lancets 28 gauge 1 each, As needed    FreeStyle Lite Strips strip TEST THREE TIMES A DAY    insulin degludec (Tresiba FlexTouch U-100) 100 unit/mL (3 mL) injection INJECT 80 UNITS UNDER THE SKIN ONCE DAILY AS DIRECTED PER INSULIN INSTRUCTIONS    levocetirizine (XYZAL) 2.5 mg,  "Every evening    lisinopril 40 mg, oral, Daily    meclizine (ANTIVERT) 25 mg, 3 times daily PRN    metFORMIN XR (GLUCOPHAGE-XR) 500 mg, oral, 2 times daily (morning and late afternoon)    montelukast (SINGULAIR) 10 mg, oral, Daily    Mounjaro 12.5 mg, subcutaneous, Every 7 days    Mounjaro 15 mg, subcutaneous, Every 7 days    oxyCODONE (ROXICODONE) 5 mg, oral, Every 6 hours PRN    pen needle, diabetic (BD Taylor 2nd Gen Pen Needle) 32 gauge x 5/32\" needle USE ONCE DAILY WITH INSULIN    rosuvastatin (CRESTOR) 5 mg, oral, Daily        Allergies   Allergen Reactions    Egg Other    Cefaclor Hives        Objective   Vitals:    03/26/25 0818   BP: 124/70   Pulse: 80   Resp: 16      Vitals:    03/26/25 0818   Weight: 133 kg (293 lb 12.8 oz)      Body mass index is 42.16 kg/m².   Physical Exam  Constitutional:       Appearance: Normal appearance. He is overweight.   HENT:      Head: Normocephalic and atraumatic.   Neck:      Thyroid: No thyroid mass, thyromegaly or thyroid tenderness.   Cardiovascular:      Rate and Rhythm: Normal rate and regular rhythm.      Heart sounds: No murmur heard.     No gallop.   Pulmonary:      Effort: Pulmonary effort is normal.      Breath sounds: Normal breath sounds.   Abdominal:      Palpations: Abdomen is soft.      Comments: benign   Neurological:      General: No focal deficit present.      Mental Status: He is alert and oriented to person, place, and time.      Deep Tendon Reflexes: Reflexes are normal and symmetric.   Psychiatric:         Behavior: Behavior is cooperative.         Labs:  Lab Results   Component Value Date    HGBA1C 6.4 (H) 07/31/2024    TSH 2.19 10/19/2021      No results found for: \"PR1\", \"THYROIDPAB\", \"TSI\"     Assessment/Plan   Assessment & Plan  Type 2 diabetes mellitus with hyperglycemia, without long-term current use of insulin    Fasting labs due today  Meds are current for refills but encouraged he message with any concerns  Benign essential hypertension    Bp " excellent  Hyperlipidemia, unspecified hyperlipidemia type    Lipids today      Electronically signed by:  Sloane Reagan MD 03/26/25 8:27 AM

## 2025-03-28 LAB
ALBUMIN SERPL-MCNC: 4.5 G/DL (ref 3.6–5.1)
ALBUMIN/CREAT UR: 8 MG/G CREAT
ALP SERPL-CCNC: 71 U/L (ref 36–130)
ALT SERPL-CCNC: 25 U/L (ref 9–46)
ANION GAP SERPL CALCULATED.4IONS-SCNC: 10 MMOL/L (CALC) (ref 7–17)
AST SERPL-CCNC: 20 U/L (ref 10–40)
BILIRUB SERPL-MCNC: 0.5 MG/DL (ref 0.2–1.2)
BUN SERPL-MCNC: 17 MG/DL (ref 7–25)
CALCIUM SERPL-MCNC: 9.5 MG/DL (ref 8.6–10.3)
CHLORIDE SERPL-SCNC: 100 MMOL/L (ref 98–110)
CHOLEST SERPL-MCNC: 123 MG/DL
CHOLEST/HDLC SERPL: 2.9 (CALC)
CO2 SERPL-SCNC: 28 MMOL/L (ref 20–32)
CREAT SERPL-MCNC: 0.94 MG/DL (ref 0.6–1.29)
CREAT UR-MCNC: 85 MG/DL (ref 20–320)
EGFRCR SERPLBLD CKD-EPI 2021: 104 ML/MIN/1.73M2
ERYTHROCYTE [DISTWIDTH] IN BLOOD BY AUTOMATED COUNT: 13.5 % (ref 11–15)
EST. AVERAGE GLUCOSE BLD GHB EST-MCNC: 111 MG/DL
EST. AVERAGE GLUCOSE BLD GHB EST-SCNC: 6.2 MMOL/L
GLUCOSE SERPL-MCNC: 76 MG/DL (ref 65–99)
HBA1C MFR BLD: 5.5 % OF TOTAL HGB
HCT VFR BLD AUTO: 45 % (ref 38.5–50)
HDLC SERPL-MCNC: 43 MG/DL
HGB BLD-MCNC: 15 G/DL (ref 13.2–17.1)
LDLC SERPL CALC-MCNC: 60 MG/DL (CALC)
MCH RBC QN AUTO: 28.5 PG (ref 27–33)
MCHC RBC AUTO-ENTMCNC: 33.3 G/DL (ref 32–36)
MCV RBC AUTO: 85.6 FL (ref 80–100)
MICROALBUMIN UR-MCNC: 0.7 MG/DL
NONHDLC SERPL-MCNC: 80 MG/DL (CALC)
PLATELET # BLD AUTO: 270 THOUSAND/UL (ref 140–400)
PMV BLD REES-ECKER: 9.8 FL (ref 7.5–12.5)
POTASSIUM SERPL-SCNC: 4 MMOL/L (ref 3.5–5.3)
PROT SERPL-MCNC: 6.8 G/DL (ref 6.1–8.1)
RBC # BLD AUTO: 5.26 MILLION/UL (ref 4.2–5.8)
SODIUM SERPL-SCNC: 138 MMOL/L (ref 135–146)
TRIGL SERPL-MCNC: 115 MG/DL
WBC # BLD AUTO: 9.5 THOUSAND/UL (ref 3.8–10.8)

## 2025-04-01 ENCOUNTER — APPOINTMENT (OUTPATIENT)
Dept: SURGERY | Facility: CLINIC | Age: 45
End: 2025-04-01
Payer: COMMERCIAL

## 2025-04-14 ENCOUNTER — APPOINTMENT (OUTPATIENT)
Dept: ENDOCRINOLOGY | Facility: CLINIC | Age: 45
End: 2025-04-14
Payer: COMMERCIAL

## 2025-04-15 ENCOUNTER — APPOINTMENT (OUTPATIENT)
Dept: PRIMARY CARE | Facility: CLINIC | Age: 45
End: 2025-04-15
Payer: COMMERCIAL

## (undated) DEVICE — BRIEF, CURITY, XLARGE, MESH

## (undated) DEVICE — DRESSING, ABDOMINAL PAD, CURITY, 8 X 10 IN

## (undated) DEVICE — COVER, CART, 45 X 27 X 48 IN, CLEAR

## (undated) DEVICE — Device

## (undated) DEVICE — DRESSING, NON-ADHERENT, TELFA, OUCHLESS, 3 X 8 IN, STERILE

## (undated) DEVICE — TIP, SUCTION, YANKAUER, FLEXIBLE

## (undated) DEVICE — DRAPE, LEGGINGS, 48 X 31 IN, STERILE, LF

## (undated) DEVICE — MANIFOLD, 4 PORT NEPTUNE STANDARD